# Patient Record
Sex: FEMALE | Race: WHITE | NOT HISPANIC OR LATINO | Employment: FULL TIME | URBAN - METROPOLITAN AREA
[De-identification: names, ages, dates, MRNs, and addresses within clinical notes are randomized per-mention and may not be internally consistent; named-entity substitution may affect disease eponyms.]

---

## 2021-07-08 ENCOUNTER — TELEPHONE (OUTPATIENT)
Dept: OBSTETRICS AND GYNECOLOGY | Facility: CLINIC | Age: 66
End: 2021-07-08

## 2021-07-09 ENCOUNTER — TELEPHONE (OUTPATIENT)
Dept: OBSTETRICS AND GYNECOLOGY | Facility: CLINIC | Age: 66
End: 2021-07-09

## 2022-03-08 DIAGNOSIS — Z12.11 COLON CANCER SCREENING: Primary | ICD-10-CM

## 2022-04-26 VITALS — BODY MASS INDEX: 26.22 KG/M2 | WEIGHT: 148 LBS | HEIGHT: 63 IN

## 2022-04-26 DIAGNOSIS — Z12.11 COLON CANCER SCREENING: Primary | ICD-10-CM

## 2022-04-26 RX ORDER — DESVENLAFAXINE SUCCINATE 25 MG/1
TABLET, EXTENDED RELEASE ORAL
COMMUNITY
Start: 2022-01-03 | End: 2022-09-13

## 2022-04-26 RX ORDER — LEVOTHYROXINE SODIUM 88 UG/1
88 TABLET ORAL
COMMUNITY
Start: 2022-04-12 | End: 2022-10-05 | Stop reason: SDUPTHER

## 2022-04-26 RX ORDER — SOD SULF/POT CHLORIDE/MAG SULF 1.479 G
12 TABLET ORAL DAILY
Qty: 24 TABLET | Refills: 0 | Status: CANCELLED | OUTPATIENT
Start: 2022-04-26

## 2022-04-26 NOTE — TELEPHONE ENCOUNTER
----- Message from Marci Villalobos sent at 4/26/2022  9:39 AM CDT -----  .Type:  Patient Returning Call    Who Called:self  Who Left Message for Patient:gualberto  Does the patient know what this is regarding?:yes  Would the patient rather a call back or a response via [facility]? Call back  Best Call Back Number:.[phone]    Additional Information: referral

## 2022-04-27 NOTE — TELEPHONE ENCOUNTER
----- Message from Mack Berg sent at 4/27/2022  9:12 AM CDT -----  Contact: Patient  Patient need the nurse to call her      Call back # [phone]

## 2022-04-27 NOTE — TELEPHONE ENCOUNTER
I spoke with the patient who reports difficulty swallowing solids. No issues with liquids. I saw that she is scheduled for a colonoscopy, but per ed chart, not due for screening until 2024. I believe scheduled from referral, so may be diagnostic? She reports that sometimes she feels as if she is going to pass out before a BM.  Either way, will she need clinic visit for EGD?  KDL, CMA

## 2022-05-02 PROBLEM — B00.9 HSV INFECTION: Status: ACTIVE | Noted: 2022-05-02

## 2022-05-02 NOTE — TELEPHONE ENCOUNTER
"The patient is technically not due for a repeat screening colonoscopy until 6/24/2024. She was marked as "No Show" for her follow up to the EGD from 2018. Make OV with [facility] before that time to determine if EGD or E/C indicated. I see 7/2022 lists in Epic but I cannot tell what for. OV or procedure?  NBP  "

## 2022-05-25 ENCOUNTER — TELEPHONE (OUTPATIENT)
Dept: GASTROENTEROLOGY | Facility: CLINIC | Age: 67
End: 2022-05-25

## 2022-05-25 NOTE — TELEPHONE ENCOUNTER
----- Message from Alberta Nash MA sent at 5/24/2022 10:38 AM CDT -----    ----- Message -----  From: Doreen Pearl  Sent: 5/24/2022  10:37 AM CDT  To: Zohra Rangel Staff    Pt needing to reschedule appt tomorrow, states she is stuck in [place] but wants to possibly be seen Thursday or Friday. Please call at [phone]

## 2022-05-27 ENCOUNTER — TELEPHONE (OUTPATIENT)
Dept: GASTROENTEROLOGY | Facility: CLINIC | Age: 67
End: 2022-05-27
Payer: COMMERCIAL

## 2022-05-27 NOTE — TELEPHONE ENCOUNTER
----- Message from Rachel Flores sent at 5/27/2022 11:22 AM CDT -----  Contact: self  Requesting a call back regarding rescheduling the new patient appointment with the nurse that she previously canceled. Please call back at [phone]

## 2022-05-30 ENCOUNTER — TELEPHONE (OUTPATIENT)
Dept: GASTROENTEROLOGY | Facility: CLINIC | Age: 67
End: 2022-05-30
Payer: COMMERCIAL

## 2022-05-30 NOTE — TELEPHONE ENCOUNTER
----- Message from Theodora Covarrubias MA sent at 5/27/2022 11:45 AM CDT -----    ----- Message -----  From: Paty Shelby  Sent: 5/27/2022  11:44 AM CDT  To: Quentin TUBBS Staff    Shira Franks is returning a missed call from Theodora. Please give her a call back at [phone]

## 2022-06-01 ENCOUNTER — TELEPHONE (OUTPATIENT)
Dept: OBSTETRICS AND GYNECOLOGY | Facility: CLINIC | Age: 67
End: 2022-06-01
Payer: COMMERCIAL

## 2022-06-01 NOTE — TELEPHONE ENCOUNTER
----- Message from Mack Berg sent at 6/1/2022 10:27 AM CDT -----  Contact: Patient  Please call the patient to schedule a sooner appointment    Patient need to schedule her annual      Call back #  [phone]

## 2022-06-15 NOTE — PROGRESS NOTES
Clinic Note    Reason for visit:  The primary encounter diagnosis was Esophageal dysphagia. Diagnoses of Diarrhea, unspecified type, Constipation, unspecified constipation type, Gastroesophageal reflux disease, unspecified whether esophagitis present, and Anemia, unspecified type were also pertinent to this visit.    PCP: Kaur Milian       HPI:  This is a 66 y.o. female who was last seen by Dr. Saavedra in 2018. Patient is scheduled for colonoscopy 7/6/2022 and is here to discuss upper GI symptoms. Patient reports dysphagia with solids x few years. Patient states food eventually goes down. She also reports reflux, does not take any medication for it but has taken panto in the past. Denies any melena or blood in stool. Patient also reports dry cough that has been going on a couple years. Patient reports constipation alternating with diarrhea, but more diarrhea phase. Lately, having daily BMs, but sometimes has to strain. Patient reports being anemic off and on her whole life. Does not know source of anemia and has not had work up that she knows of.       Outside record reviewed:  Last EGD 11/15/2018: GBx wnl, GEBx reflux, EBx reflux.  Last colonoscopy with FXB 6/24/2014: normal, repeat in 10 yr    Review of Systems   Constitutional: Positive for fatigue. Negative for chills, diaphoresis, fever and unexpected weight change.   HENT: Negative for mouth sores, nosebleeds, postnasal drip, sore throat, trouble swallowing and voice change.    Eyes: Positive for pain and eye dryness. Negative for discharge.   Respiratory: Positive for choking and shortness of breath. Negative for apnea, cough, chest tightness and wheezing.    Cardiovascular: Negative for chest pain, palpitations, leg swelling and claudication.   Gastrointestinal: Positive for constipation, diarrhea and reflux. Negative for abdominal distention, abdominal pain, anal bleeding, blood in stool, change in bowel habit, nausea, rectal pain, vomiting, fecal  incontinence and change in bowel habit.   Genitourinary: Negative for bladder incontinence, difficulty urinating, dysuria, flank pain, frequency and hematuria.   Musculoskeletal: Positive for back pain. Negative for arthralgias, joint swelling and joint deformity.   Integumentary:  Negative for color change, rash and wound.   Allergic/Immunologic: Negative for environmental allergies and food allergies.   Neurological: Negative for seizures, facial asymmetry, speech difficulty, weakness, headaches and memory loss.   Hematological: Negative for adenopathy. Does not bruise/bleed easily.   Psychiatric/Behavioral: Negative for agitation, behavioral problems, confusion, hallucinations and sleep disturbance.      Past Medical History:   Diagnosis Date    Anemia     BMI 26.0-26.9,adult     History of herpes simplex infection      Past Surgical History:   Procedure Laterality Date    AUGMENTATION OF BREAST      BUNIONECTOMY Bilateral     COLONOSCOPY  06/24/2014    LIPOSUCTION OF THIGH      REMOVAL OF BREAST IMPLANT       Family History   Problem Relation Age of Onset    Heart failure Mother     Liver disease Mother     COPD Mother     Cancer Father     Heart failure Father     Lung cancer Father     Skin cancer Father      Social History     Tobacco Use    Smoking status: Never Smoker    Smokeless tobacco: Never Used     Review of patient's allergies indicates:  No Known Allergies   Medication List with Changes/Refills   New Medications    PANTOPRAZOLE (PROTONIX) 40 MG TABLET    Take 1 tablet (40 mg total) by mouth once daily.    SOD SULF-POT CHLORIDE-MAG SULF (SUTAB) 1.479-0.188- 0.225 GRAM TABLET    Take 12 tablets by mouth once daily. Take according to package instructions with indicated amount of water. No breakfast day before test. May substitute with Suprep, Clenpiq, Plenvu, Moviprep or GoLytely based on Rx plan and patient preference.   Current Medications    DESVENLAFAXINE SUCCINATE (PRISTIQ) 25 MG  "TB24        LEVOTHYROXINE (SYNTHROID) 88 MCG TABLET        MULTIVIT-MIN-IRON-FA-LUTEIN (CENTRUM SILVER WOMEN) 8 MG IRON-400 MCG-300 MCG TAB    Centrum Silver Women   OTC daily         Vital Signs:  BP (P) 124/81 (BP Location: Right arm, Patient Position: Sitting)   Pulse (P) 79   Ht (P) 5' 3" (1.6 m)   Wt (P) 66.5 kg (146 lb 9.6 oz)   SpO2 (P) 97%   BMI (P) 25.97 kg/m²   Body mass index is 25.97 kg/m² (pended).      Physical Exam  Vitals reviewed.   Constitutional:       General: She is awake. She is not in acute distress.     Appearance: Normal appearance. She is well-developed. She is not ill-appearing, toxic-appearing or diaphoretic.   HENT:      Head: Normocephalic and atraumatic.      Nose: Nose normal.      Mouth/Throat:      Mouth: Mucous membranes are moist.      Pharynx: Oropharynx is clear. No oropharyngeal exudate or posterior oropharyngeal erythema.   Eyes:      General: Lids are normal. Gaze aligned appropriately. No scleral icterus.        Right eye: No discharge.         Left eye: No discharge.      Extraocular Movements: Extraocular movements intact.      Conjunctiva/sclera: Conjunctivae normal.   Neck:      Trachea: Trachea normal.   Cardiovascular:      Rate and Rhythm: Normal rate and regular rhythm.      Pulses:           Radial pulses are 2+ on the right side and 2+ on the left side.   Pulmonary:      Effort: Pulmonary effort is normal. No respiratory distress.      Breath sounds: Normal breath sounds. No stridor. No wheezing or rhonchi.   Chest:      Chest wall: No tenderness.   Abdominal:      General: Bowel sounds are normal. There is no distension.      Palpations: Abdomen is soft. There is no fluid wave, hepatomegaly or mass.      Tenderness: There is no abdominal tenderness. There is no guarding or rebound.   Musculoskeletal:         General: No tenderness or deformity.      Cervical back: Full passive range of motion without pain and neck supple. No tenderness.      Right lower leg: " No edema.      Left lower leg: No edema.   Lymphadenopathy:      Cervical: No cervical adenopathy.   Skin:     General: Skin is warm and dry.      Capillary Refill: Capillary refill takes less than 2 seconds.      Coloration: Skin is not cyanotic, jaundiced or pale.      Findings: No rash.   Neurological:      General: No focal deficit present.      Mental Status: She is alert and oriented to person, place, and time.      Cranial Nerves: No facial asymmetry.      Motor: No tremor.   Psychiatric:         Attention and Perception: Attention normal.         Mood and Affect: Mood and affect normal.         Speech: Speech normal.         Behavior: Behavior normal. Behavior is cooperative.            All of the data above and below has been reviewed by myself and any further interpretations will be reflected in the assessment and plan.   The data includes review of external notes, and independent interpretation of lab results, procedures, x-rays, and imaging reports.      Assessment:  Esophageal dysphagia  -     Ambulatory Referral to External Surgery    Diarrhea, unspecified type  -     Ambulatory Referral to External Surgery    Constipation, unspecified constipation type    Gastroesophageal reflux disease, unspecified whether esophagitis present  -     Ambulatory Referral to External Surgery  -     pantoprazole (PROTONIX) 40 MG tablet; Take 1 tablet (40 mg total) by mouth once daily.  Dispense: 30 tablet; Refill: 3    Anemia, unspecified type    Other orders  -     sod sulf-pot chloride-mag sulf (SUTAB) 1.479-0.188- 0.225 gram tablet; Take 12 tablets by mouth once daily. Take according to package instructions with indicated amount of water. No breakfast day before test. May substitute with Suprep, Clenpiq, Plenvu, Moviprep or GoLytely based on Rx plan and patient preference.  Dispense: 24 tablet; Refill: 0      Plan for colonoscopy with colon biopsies to rule out microscopic colitis  Dysphagia: stricture v tumor v  esophagitis v dysmotility. Consider EM if EGD unrevealing.  Will discuss anemia work up with Dr. Saavedra.      Recommendations:  Schedule EGD to be done with colonoscopy on 7/6/2022.  Begin taking pantoprazole 40 mg daily.  Begin using Squatty Potty with each restroom visit at home.     Risks, benefits, and alternatives of medical management, any associated procedures, and/or treatment discussed with the patient. Patient given opportunity to ask questions and voices understanding. Patient has elected to proceed with the recommended care modalities as discussed.        Order summary:  Orders Placed This Encounter   Procedures    Ambulatory Referral to External Surgery           Angelica Loa NP    This document may have been created using a voice recognition transcribing system. Incorrect words or phrases may have been missed during proofreading. Please interpret accordingly or contact me for clarification.

## 2022-06-16 ENCOUNTER — OFFICE VISIT (OUTPATIENT)
Dept: GASTROENTEROLOGY | Facility: CLINIC | Age: 67
End: 2022-06-16
Payer: COMMERCIAL

## 2022-06-16 DIAGNOSIS — D64.9 ANEMIA, UNSPECIFIED TYPE: ICD-10-CM

## 2022-06-16 DIAGNOSIS — K59.00 CONSTIPATION, UNSPECIFIED CONSTIPATION TYPE: ICD-10-CM

## 2022-06-16 DIAGNOSIS — R13.19 ESOPHAGEAL DYSPHAGIA: Primary | ICD-10-CM

## 2022-06-16 DIAGNOSIS — R19.7 DIARRHEA, UNSPECIFIED TYPE: ICD-10-CM

## 2022-06-16 DIAGNOSIS — K21.9 GASTROESOPHAGEAL REFLUX DISEASE, UNSPECIFIED WHETHER ESOPHAGITIS PRESENT: ICD-10-CM

## 2022-06-16 PROCEDURE — 99204 PR OFFICE/OUTPT VISIT, NEW, LEVL IV, 45-59 MIN: ICD-10-PCS | Mod: S$GLB,,,

## 2022-06-16 PROCEDURE — 99204 OFFICE O/P NEW MOD 45 MIN: CPT | Mod: S$GLB,,,

## 2022-06-16 RX ORDER — PANTOPRAZOLE SODIUM 40 MG/1
40 TABLET, DELAYED RELEASE ORAL DAILY
Qty: 30 TABLET | Refills: 3 | Status: SHIPPED | OUTPATIENT
Start: 2022-06-16 | End: 2022-10-26

## 2022-06-16 RX ORDER — MULTIVIT,IRON,MINERALS/LUTEIN
TABLET ORAL
COMMUNITY
End: 2022-10-26

## 2022-06-16 RX ORDER — SOD SULF/POT CHLORIDE/MAG SULF 1.479 G
12 TABLET ORAL DAILY
Qty: 24 TABLET | Refills: 0 | Status: SHIPPED | OUTPATIENT
Start: 2022-06-16 | End: 2022-09-13

## 2022-06-16 NOTE — LETTER
June 16, 2022        Kaur Milian MD  [address] [place] [address]             [facility]  [address] [place] [address]  Phone: [phone]  Fax: [phone]   Patient: Shira Franks   MR Number: [id number]   YOB: 1955   Date of Visit: 6/16/2022       Dear Dr. Milian:    Thank you for referring Shira Franks to me for evaluation. Attached you will find relevant portions of my assessment and plan of care.    If you have questions, please do not hesitate to call me. I look forward to following Shira Franks along with you.    Sincerely,      Angelica Lao, BEREKET            CC  No Recipients    Enclosure

## 2022-06-29 ENCOUNTER — TELEPHONE (OUTPATIENT)
Dept: GASTROENTEROLOGY | Facility: CLINIC | Age: 67
End: 2022-06-29
Payer: COMMERCIAL

## 2022-06-29 DIAGNOSIS — D64.9 ANEMIA, UNSPECIFIED TYPE: Primary | ICD-10-CM

## 2022-06-29 NOTE — TELEPHONE ENCOUNTER
----- Message from Isabel Saavedra MD sent at 6/21/2022  4:56 AM CDT -----      ----- Message -----  From: Angelica Lao NP  Sent: 6/16/2022  10:37 AM CDT  To: Isabel Saavedra MD

## 2022-07-01 ENCOUNTER — TELEPHONE (OUTPATIENT)
Dept: GASTROENTEROLOGY | Facility: CLINIC | Age: 67
End: 2022-07-01
Payer: COMMERCIAL

## 2022-07-01 NOTE — TELEPHONE ENCOUNTER
----- Message from Theodora Covarrubias MA sent at 7/1/2022 11:16 AM CDT -----    ----- Message -----  From: Paty Shelby  Sent: 7/1/2022  11:13 AM CDT  To: Quentin TUBBS Staff    Shira Franks is returning a missed call from the nurse, please give her another call back at [phone] (home)

## 2022-07-06 ENCOUNTER — OUTSIDE PLACE OF SERVICE (OUTPATIENT)
Dept: GASTROENTEROLOGY | Facility: CLINIC | Age: 67
End: 2022-07-06
Payer: COMMERCIAL

## 2022-07-06 LAB — CRC RECOMMENDATION EXT: NORMAL

## 2022-07-06 PROCEDURE — 43239 PR EGD, FLEX, W/BIOPSY, SGL/MULTI: ICD-10-PCS | Mod: 51,,, | Performed by: INTERNAL MEDICINE

## 2022-07-06 PROCEDURE — 45380 PR COLONOSCOPY,BIOPSY: ICD-10-PCS | Mod: ,,, | Performed by: INTERNAL MEDICINE

## 2022-07-06 PROCEDURE — 43239 EGD BIOPSY SINGLE/MULTIPLE: CPT | Mod: 51,,, | Performed by: INTERNAL MEDICINE

## 2022-07-06 PROCEDURE — 45380 COLONOSCOPY AND BIOPSY: CPT | Mod: ,,, | Performed by: INTERNAL MEDICINE

## 2022-07-11 ENCOUNTER — TELEPHONE (OUTPATIENT)
Dept: GASTROENTEROLOGY | Facility: CLINIC | Age: 67
End: 2022-07-11
Payer: COMMERCIAL

## 2022-07-11 NOTE — TELEPHONE ENCOUNTER
DBx nl, GBx react w/o Hp, GEBx reflux, CBx LC.  CMA to notify patient. No signs of infection, precancerous cells or Celiac disease on the upper endoscopy biopsies.  Her colon Bx confirm lymphocytic colitis which does not damage the colon but leads to diarrhea. Rec OTC peptobismol (generic okay) 2 chewable 4 times a day (not specific on the timing) x8 weeks. Black stools are common with the Pepto and harmless. I do not see the anemia work up results. Did she have them drawn? Let me know. Her next colonoscopy should be at her 10 year due date of 6/24/2014 give the stool throughout the colon in this colonoscopy.  NBP

## 2022-07-12 NOTE — TELEPHONE ENCOUNTER
Results and recommendations discussed with patient, who voices understanding and agreement. They will contact us with any issues. She reports having labs drawn at [facility]. I uploaded them and sent to [facility] and [facility] for review.  MALGORZATAL, CMA

## 2022-07-13 ENCOUNTER — TELEPHONE (OUTPATIENT)
Dept: GASTROENTEROLOGY | Facility: CLINIC | Age: 67
End: 2022-07-13
Payer: COMMERCIAL

## 2022-07-13 DIAGNOSIS — R13.19 ESOPHAGEAL DYSPHAGIA: Primary | ICD-10-CM

## 2022-07-13 NOTE — TELEPHONE ENCOUNTER
Results and recommendations discussed with patient, who voices understanding and agreement. She will come by the office to sign consents as able.    JEN, MICHAEL

## 2022-07-13 NOTE — TELEPHONE ENCOUNTER
Hb elect nl. Okay to order EM.   NBP    From [facility]:  CBC wnl, Hgb nl, Ferr/Folate/LDH/Fe/%sat/TIBC/Retic nl. B12 1469H.      Labs overall look well, no anemia, iron, or vitamin deficiencies. Patient was having dysphagia at time of OV. Okay to recommend EM?

## 2022-09-12 ENCOUNTER — DOCUMENTATION ONLY (OUTPATIENT)
Dept: GASTROENTEROLOGY | Facility: CLINIC | Age: 67
End: 2022-09-12
Payer: COMMERCIAL

## 2022-09-13 ENCOUNTER — OFFICE VISIT (OUTPATIENT)
Dept: OBSTETRICS AND GYNECOLOGY | Facility: CLINIC | Age: 67
End: 2022-09-13
Payer: COMMERCIAL

## 2022-09-13 VITALS
BODY MASS INDEX: 26.22 KG/M2 | HEIGHT: 63 IN | SYSTOLIC BLOOD PRESSURE: 121 MMHG | DIASTOLIC BLOOD PRESSURE: 78 MMHG | WEIGHT: 148 LBS

## 2022-09-13 DIAGNOSIS — Z12.31 SCREENING MAMMOGRAM, ENCOUNTER FOR: ICD-10-CM

## 2022-09-13 DIAGNOSIS — N95.1 MENOPAUSAL SYMPTOMS: ICD-10-CM

## 2022-09-13 DIAGNOSIS — Z01.419 ENCOUNTER FOR ROUTINE GYNECOLOGIC EXAMINATION IN MEDICARE PATIENT: Primary | ICD-10-CM

## 2022-09-13 DIAGNOSIS — E55.9 VITAMIN D DEFICIENCY: ICD-10-CM

## 2022-09-13 DIAGNOSIS — R32 URINARY INCONTINENCE, UNSPECIFIED TYPE: ICD-10-CM

## 2022-09-13 DIAGNOSIS — Z78.0 MENOPAUSE: ICD-10-CM

## 2022-09-13 DIAGNOSIS — N20.0 KIDNEY STONE: ICD-10-CM

## 2022-09-13 LAB
E2: <12.2 PG/ML
FREE TESTOSTERONE: NORMAL

## 2022-09-13 PROCEDURE — G0101 CA SCREEN;PELVIC/BREAST EXAM: HCPCS | Mod: S$GLB,,, | Performed by: OBSTETRICS & GYNECOLOGY

## 2022-09-13 PROCEDURE — G0101 PR CA SCREEN;PELVIC/BREAST EXAM: ICD-10-PCS | Mod: S$GLB,,, | Performed by: OBSTETRICS & GYNECOLOGY

## 2022-09-13 RX ORDER — FLUOXETINE 10 MG/1
10 CAPSULE ORAL DAILY
COMMUNITY
End: 2022-10-03

## 2022-09-13 NOTE — PROGRESS NOTES
Shira Franks is a 67 y.o. female  who presents for a well woman exam.  She has more problems with urinary incontinence over the last few years and was recently  diagnosed with a kidney stone at her PCP's office. She notes vaginal dryness and low libido.     Past Medical History:   Diagnosis Date    Anemia     History of herpes simplex infection     Hypothyroidism, unspecified     Kidney stone     Lymphocytic colitis     Osteopenia     Vitamin D deficiency        Past Surgical History:   Procedure Laterality Date    AUGMENTATION OF BREAST      BUNIONECTOMY Bilateral     COLONOSCOPY      14,  22    LIPOSUCTION OF THIGH      REMOVAL OF BREAST IMPLANT  2016    and mastopexy    TRIGGER FINGER RELEASE Left 2020       OB History    Para Term  AB Living   0 0 0 0 0 0   SAB IAB Ectopic Multiple Live Births   0 0 0 0 0       Family History   Problem Relation Age of Onset    Heart failure Mother     Liver disease Mother     COPD Mother     Cancer Father     Heart failure Father     Lung cancer Father     Skin cancer Father        Social History     Tobacco Use    Smoking status: Never    Smokeless tobacco: Never   Substance Use Topics    Alcohol use: Never    Drug use: Never         Current Outpatient Medications:     FLUoxetine 10 MG capsule, Take 10 mg by mouth once daily., Disp: , Rfl:     levothyroxine (SYNTHROID) 88 MCG tablet, , Disp: , Rfl:     multivit-min-iron-FA-lutein (CENTRUM SILVER WOMEN) 8 mg iron-400 mcg-300 mcg Tab, Centrum Silver Women  OTC daily, Disp: , Rfl:     pantoprazole (PROTONIX) 40 MG tablet, Take 1 tablet (40 mg total) by mouth once daily., Disp: 30 tablet, Rfl: 3     Review of patient's allergies indicates:  No Known Allergies     ROS:  GENERAL: Denies weight gain or weight loss. Feeling well overall.   SKIN: Denies rash or lesions.   HEAD: Denies head injury or headache.   NODES: Denies enlarged lymph nodes.   CHEST: Denies shortness of breath.   CARDIOVASCULAR:  "Denies palpitations or chest pain.   ABDOMEN: Denies abdominal pain, constipation, diarrhea, nausea, vomiting or rectal bleeding.   URINARY: Denies frequency, dysuria, hematuria, or burning on urination.  REPRODUCTIVE: See HPI.   BREASTS: Denies pain, lumps, or nipple discharge.   HEMATOLOGIC: Denies easy bruisability or excessive bleeding.  MUSCULOSKELETAL: Denies joint pain or swelling.   NEUROLOGIC: Denies syncope or weakness.   PSYCHIATRIC: Denies depression, anxiety or mood swings.    PHYSICAL EXAM:    /78   Ht 5' 3" (1.6 m)   Wt 67.1 kg (148 lb)   BMI 26.22 kg/m²    Body mass index is 26.22 kg/m².     APPEARANCE: Well nourished, well developed, in no acute distress.  AFFECT: WNL, alert and oriented x 3  SKIN: No acne or hirsutism  NECK: Neck symmetric without masses or thyromegaly  NODES: No inguinal, cervical, axillary, or femoral lymph node enlargement  CHEST: Good respiratory effect  ABDOMEN: Soft.  No tenderness or masses.  No hepatosplenomegaly.  No hernias.  BREASTS: Symmetrical, no skin changes or visible lesions.  No palpable masses, nipple discharge bilaterally.  PELVIC: Normal external genitalia without lesions.  Normal hair distribution.  Adequate perineal body, normal urethral meatus.  Urethra and bladder without tenderness or masses. Vagina moist and well rugated without lesions or discharge.  Cervix pink, without lesions, discharge or tenderness.  No significant cystocele or rectocele.  Bimanual exam shows uterus to be normal size, regular, mobile and nontender.  Adnexa without masses or tenderness.    EXTREMITIES: No edema.     Encounter for routine gynecologic examination in Medicare patient  -     Liquid-based pap smear, screening    Screening mammogram, encounter for  -     Mammo Digital Screening Bilat w/ [facility]; Future    Vitamin D deficiency  -     Calcitriol; Future; Expected date: 09/13/2022    Menopause  -     DXA Bone Density Spine And Hip; Future; Expected date: " 09/13/2022    Kidney stone  -     Ambulatory referral/consult to Urology; Future; Expected date: 09/20/2022    Urinary incontinence, unspecified type  -     Ambulatory referral/consult to Urology; Future; Expected date: 09/20/2022    Menopausal symptoms  -     Testosterone, Free; Future; Expected date: 09/20/2022  -     Estradiol; Future; Expected date: 09/20/2022  Discussed possible addition of vaginal estrogen  Will obtain CT report from Jasmyn Bailey NP showing kidney stone  Patient was counseled today on A.C.S. Pap guidelines and recommendations for yearly pelvic exams, mammograms and monthly self breast exams; to see her PCP for other health maintenance.     Follow up in 1 year

## 2022-09-14 ENCOUNTER — TELEPHONE (OUTPATIENT)
Dept: OBSTETRICS AND GYNECOLOGY | Facility: CLINIC | Age: 67
End: 2022-09-14
Payer: COMMERCIAL

## 2022-09-14 DIAGNOSIS — N95.1 MENOPAUSAL SYMPTOMS: Primary | ICD-10-CM

## 2022-09-14 DIAGNOSIS — N95.2 VAGINAL ATROPHY: Primary | ICD-10-CM

## 2022-09-14 RX ORDER — ESTRADIOL 0.1 MG/G
1 CREAM VAGINAL
Qty: 42.5 G | Refills: 3 | Status: SHIPPED | OUTPATIENT
Start: 2022-09-15 | End: 2022-10-05 | Stop reason: ALTCHOICE

## 2022-09-14 RX ORDER — ESTERIFIED ESTROGEN AND METHYLTESTOSTERONE .625; 1.25 MG/1; MG/1
1 TABLET ORAL DAILY
Qty: 30 TABLET | Refills: 5 | Status: SHIPPED | OUTPATIENT
Start: 2022-09-14 | End: 2022-12-06 | Stop reason: DRUGHIGH

## 2022-09-14 NOTE — TELEPHONE ENCOUNTER
----- Message from Shantelle Hebert sent at 9/14/2022  3:52 PM CDT -----  Type:  Needs Medical Advice    Who Called: Shira Franks    Symptoms (please be specific): -   How long has patient had these symptoms:  -  Pharmacy name and phone #:  -  Would the patient rather a call back or a response via [facility]?    Best Call Back Number: [phone]    Additional Information: pt would like to speak w/ María about medication, please call

## 2022-09-14 NOTE — TELEPHONE ENCOUNTER
"Pt. Called to see if she has to take new HRT qd.  I advised pt. "Yes".  Pt. Verbalized understanding.  "

## 2022-09-16 LAB — Lab: NORMAL

## 2022-09-18 ENCOUNTER — PATIENT MESSAGE (OUTPATIENT)
Dept: OBSTETRICS AND GYNECOLOGY | Facility: CLINIC | Age: 67
End: 2022-09-18
Payer: COMMERCIAL

## 2022-09-19 ENCOUNTER — PATIENT MESSAGE (OUTPATIENT)
Dept: OBSTETRICS AND GYNECOLOGY | Facility: CLINIC | Age: 67
End: 2022-09-19
Payer: COMMERCIAL

## 2022-09-19 NOTE — TELEPHONE ENCOUNTER
This may be due to not taking deep breaths and fully inflating her lungs if she was in pain from the kidney stone. She can follow up with her PCP regarding this finding.

## 2022-09-29 ENCOUNTER — TELEPHONE (OUTPATIENT)
Dept: OBSTETRICS AND GYNECOLOGY | Facility: CLINIC | Age: 67
End: 2022-09-29
Payer: COMMERCIAL

## 2022-09-29 DIAGNOSIS — N30.90 CYSTITIS: Primary | ICD-10-CM

## 2022-09-29 RX ORDER — SULFAMETHOXAZOLE AND TRIMETHOPRIM 800; 160 MG/1; MG/1
1 TABLET ORAL 2 TIMES DAILY
Qty: 14 TABLET | Refills: 0 | Status: SHIPPED | OUTPATIENT
Start: 2022-09-29 | End: 2022-10-07 | Stop reason: ALTCHOICE

## 2022-09-29 RX ORDER — PHENAZOPYRIDINE HYDROCHLORIDE 200 MG/1
200 TABLET, FILM COATED ORAL 3 TIMES DAILY PRN
Qty: 6 TABLET | Refills: 0 | Status: SHIPPED | OUTPATIENT
Start: 2022-09-29 | End: 2022-10-26

## 2022-09-29 NOTE — TELEPHONE ENCOUNTER
----- Message from Lona Tinajero sent at 9/29/2022  2:43 PM CDT -----  Regarding: Sooner Appointment  Contact: patient  Type:  Sooner Apoointment Request    Caller is requesting a sooner appointment.  Caller declined first available appointment listed below.  Caller will not accept being placed on the waitlist and is requesting a message be sent to doctor.  Name of Caller:Shira   When is the first available appointment? 12/2022  Symptoms: bladder infection or UTI  Would the patient rather a call back or a response via [facility]?  Call back   Best Call Back Number: [phone] (home)     Additional Information: The patient would like if something can be called in for her if she can't get a sooner appointment    ThanksPIOTR

## 2022-09-29 NOTE — TELEPHONE ENCOUNTER
C/o lower mid. abdominal pain/cramps/bloating/frequent urination x 1 day.  No other bladder/urinary sx.

## 2022-10-03 ENCOUNTER — PATIENT MESSAGE (OUTPATIENT)
Dept: OBSTETRICS AND GYNECOLOGY | Facility: CLINIC | Age: 67
End: 2022-10-03
Payer: COMMERCIAL

## 2022-10-03 ENCOUNTER — OFFICE VISIT (OUTPATIENT)
Dept: OBSTETRICS AND GYNECOLOGY | Facility: CLINIC | Age: 67
End: 2022-10-03
Payer: COMMERCIAL

## 2022-10-03 VITALS
SYSTOLIC BLOOD PRESSURE: 120 MMHG | BODY MASS INDEX: 26.26 KG/M2 | DIASTOLIC BLOOD PRESSURE: 70 MMHG | HEIGHT: 63 IN | WEIGHT: 148.19 LBS | HEART RATE: 70 BPM

## 2022-10-03 DIAGNOSIS — N30.90 CYSTITIS: Primary | ICD-10-CM

## 2022-10-03 DIAGNOSIS — N89.8 VAGINAL ITCHING: ICD-10-CM

## 2022-10-03 PROCEDURE — 99213 OFFICE O/P EST LOW 20 MIN: CPT | Mod: 25,S$GLB,, | Performed by: OBSTETRICS & GYNECOLOGY

## 2022-10-03 PROCEDURE — 99213 PR OFFICE/OUTPT VISIT, EST, LEVL III, 20-29 MIN: ICD-10-PCS | Mod: 25,S$GLB,, | Performed by: OBSTETRICS & GYNECOLOGY

## 2022-10-03 PROCEDURE — 81002 PR URINALYSIS NONAUTO W/O SCOPE: ICD-10-PCS | Mod: S$GLB,,, | Performed by: OBSTETRICS & GYNECOLOGY

## 2022-10-03 PROCEDURE — 87210 PR  SMEAR,STAIN,WET MNT,INTERP: ICD-10-PCS | Mod: QW,S$GLB,, | Performed by: OBSTETRICS & GYNECOLOGY

## 2022-10-03 PROCEDURE — 81002 URINALYSIS NONAUTO W/O SCOPE: CPT | Mod: S$GLB,,, | Performed by: OBSTETRICS & GYNECOLOGY

## 2022-10-03 PROCEDURE — 87210 SMEAR WET MOUNT SALINE/INK: CPT | Mod: QW,S$GLB,, | Performed by: OBSTETRICS & GYNECOLOGY

## 2022-10-03 NOTE — PROGRESS NOTES
Chief Complaint: burning and itching externally     HPI:      Shira Franks is a 67 y.o. female  who presents complaining of vaginal/vulvar irritation the day after using estradiol vaginal cream.  No LMP recorded. Patient is postmenopausal. Currently taking Bactrim for cystitis.     Past Medical History:   Diagnosis Date    Anemia     History of herpes simplex infection     Hypothyroidism, unspecified     Kidney stone     Lymphocytic colitis     Osteopenia     Vitamin D deficiency       Past Surgical History:   Procedure Laterality Date    AUGMENTATION OF BREAST      BUNIONECTOMY Bilateral     COLONOSCOPY      14,  22    LIPOSUCTION OF THIGH      REMOVAL OF BREAST IMPLANT  2016    and mastopexy    TRIGGER FINGER RELEASE Left 2020      Social History     Tobacco Use    Smoking status: Never     Passive exposure: Never    Smokeless tobacco: Never   Substance Use Topics    Alcohol use: Never    Drug use: Never      Current Outpatient Medications on File Prior to Visit   Medication Sig Dispense Refill    estradioL (ESTRACE) 0.01 % (0.1 mg/gram) vaginal cream Place 1 g vaginally twice a week. 42.5 g 3    estrogens,esterified,-methyltestosterone 0.625-1.25mg (ESTRATEST HS) per tablet Take 1 tablet by mouth once daily. 30 tablet 5    levothyroxine (SYNTHROID) 88 MCG tablet       multivit-min-iron-FA-lutein (CENTRUM SILVER WOMEN) 8 mg iron-400 mcg-300 mcg Tab Centrum Silver Women   OTC daily      phenazopyridine (PYRIDIUM) 200 MG tablet Take 1 tablet (200 mg total) by mouth 3 (three) times daily as needed for Pain. 6 tablet 0    sulfamethoxazole-trimethoprim 800-160mg (BACTRIM DS) 800-160 mg Tab Take 1 tablet by mouth 2 (two) times daily. 14 tablet 0    pantoprazole (PROTONIX) 40 MG tablet Take 1 tablet (40 mg total) by mouth once daily. 30 tablet 3    [DISCONTINUED] FLUoxetine 10 MG capsule Take 10 mg by mouth once daily.       No current facility-administered medications on file prior to visit.     "  Review of patient's allergies indicates:  No Known Allergies       ROS:     GENERAL: Denies weight gain or weight loss. Feeling well overall.   SKIN: Denies rash or lesions.   NODES: Denies enlarged lymph nodes.   CARDIOVASCULAR: Denies palpitations or chest pain.   ABDOMEN: Denies abdominal pain, constipation, diarrhea, nausea, vomiting or rectal bleeding.   URINARY: Denies frequency, dysuria, hematuria, or burning on urination.  REPRODUCTIVE: See HPI.   BREASTS: Denies pain, lumps, or nipple discharge.   HEMATOLOGIC: Denies easy bruisability or excessive bleeding with the exception of menstrual cycles.  PSYCHIATRIC: Denies depression, anxiety or mood swings.   Physical Exam:      /70 (BP Location: Left arm, Patient Position: Sitting, BP Method: Medium (Manual))   Pulse 70   Ht 5' 3" (1.6 m)   Wt 67.2 kg (148 lb 3.2 oz)   BMI 26.25 kg/m²   Body mass index is 26.25 kg/m².     ABDOMEN: Soft.  No tenderness or masses. No hepatoslenomegaly. No hernias.   PELVIC: Erythema and swelling of labia minora.  Normal hair distribution.  Adequate perineal body, normal urethral meatus.  Urethra and bladder without tenderness or masses. Vagina moist and well rugated without lesions; +white cream noted in vault     Results:      Wet prep: negative for trichomonas, yeast, or clue cells     Assessment/Plan:     Cystitis  -     Urine culture  -     POCT Urinalysis, Dipstick, Automated, W/O Scope    Vaginal itching  -     POCT WET PREP    She will discontinue estradiol cream   "

## 2022-10-05 ENCOUNTER — OFFICE VISIT (OUTPATIENT)
Dept: PRIMARY CARE CLINIC | Facility: CLINIC | Age: 67
End: 2022-10-05
Payer: COMMERCIAL

## 2022-10-05 VITALS
DIASTOLIC BLOOD PRESSURE: 64 MMHG | SYSTOLIC BLOOD PRESSURE: 106 MMHG | HEART RATE: 66 BPM | WEIGHT: 146.81 LBS | BODY MASS INDEX: 26.01 KG/M2 | OXYGEN SATURATION: 98 % | HEIGHT: 63 IN

## 2022-10-05 DIAGNOSIS — Z23 IMMUNIZATION DUE: Primary | ICD-10-CM

## 2022-10-05 DIAGNOSIS — Z00.00 WELLNESS EXAMINATION: ICD-10-CM

## 2022-10-05 DIAGNOSIS — E03.9 HYPOTHYROIDISM, UNSPECIFIED TYPE: ICD-10-CM

## 2022-10-05 PROCEDURE — 90677 PCV20 VACCINE IM: CPT | Mod: S$GLB,,, | Performed by: INTERNAL MEDICINE

## 2022-10-05 PROCEDURE — G0009 PNEUMOCOCCAL CONJUGATE VACCINE 20-VALENT: ICD-10-PCS | Mod: S$GLB,,, | Performed by: INTERNAL MEDICINE

## 2022-10-05 PROCEDURE — 90677 PNEUMOCOCCAL CONJUGATE VACCINE 20-VALENT: ICD-10-PCS | Mod: S$GLB,,, | Performed by: INTERNAL MEDICINE

## 2022-10-05 PROCEDURE — G0009 ADMIN PNEUMOCOCCAL VACCINE: HCPCS | Mod: S$GLB,,, | Performed by: INTERNAL MEDICINE

## 2022-10-05 PROCEDURE — 99204 OFFICE O/P NEW MOD 45 MIN: CPT | Mod: 25,S$GLB,, | Performed by: INTERNAL MEDICINE

## 2022-10-05 PROCEDURE — 99204 PR OFFICE/OUTPT VISIT, NEW, LEVL IV, 45-59 MIN: ICD-10-PCS | Mod: 25,S$GLB,, | Performed by: INTERNAL MEDICINE

## 2022-10-05 RX ORDER — LEVOTHYROXINE SODIUM 88 UG/1
88 TABLET ORAL
Qty: 90 TABLET | Refills: 3 | Status: SHIPPED | OUTPATIENT
Start: 2022-10-05 | End: 2023-10-24

## 2022-10-07 ENCOUNTER — PATIENT MESSAGE (OUTPATIENT)
Dept: OBSTETRICS AND GYNECOLOGY | Facility: CLINIC | Age: 67
End: 2022-10-07
Payer: COMMERCIAL

## 2022-10-07 DIAGNOSIS — N30.90 CYSTITIS: Primary | ICD-10-CM

## 2022-10-07 DIAGNOSIS — N95.2 VAGINAL ATROPHY: Primary | ICD-10-CM

## 2022-10-07 LAB
T4 FREE SERPL-MCNC: 0.7 NG/DL (ref 0.8–1.8)
TSH SERPL-ACNC: 2.17 MIU/L (ref 0.4–4.5)
URINE CULTURE, ROUTINE: NORMAL

## 2022-10-07 RX ORDER — ESTRADIOL 10 UG/1
10 INSERT VAGINAL
Qty: 8 TABLET | Refills: 11 | Status: SHIPPED | OUTPATIENT
Start: 2022-10-10 | End: 2023-04-06 | Stop reason: ALTCHOICE

## 2022-10-07 RX ORDER — CIPROFLOXACIN 500 MG/1
500 TABLET ORAL 2 TIMES DAILY
Qty: 14 TABLET | Refills: 0 | Status: SHIPPED | OUTPATIENT
Start: 2022-10-07 | End: 2022-10-14

## 2022-10-11 ENCOUNTER — DOCUMENTATION ONLY (OUTPATIENT)
Dept: GASTROENTEROLOGY | Facility: CLINIC | Age: 67
End: 2022-10-11
Payer: COMMERCIAL

## 2022-10-12 ENCOUNTER — PATIENT MESSAGE (OUTPATIENT)
Dept: PRIMARY CARE CLINIC | Facility: CLINIC | Age: 67
End: 2022-10-12
Payer: COMMERCIAL

## 2022-10-26 ENCOUNTER — OFFICE VISIT (OUTPATIENT)
Dept: OBSTETRICS AND GYNECOLOGY | Facility: CLINIC | Age: 67
End: 2022-10-26
Payer: COMMERCIAL

## 2022-10-26 ENCOUNTER — PATIENT MESSAGE (OUTPATIENT)
Dept: OBSTETRICS AND GYNECOLOGY | Facility: CLINIC | Age: 67
End: 2022-10-26

## 2022-10-26 VITALS
BODY MASS INDEX: 25.96 KG/M2 | HEIGHT: 63 IN | WEIGHT: 146.5 LBS | HEART RATE: 65 BPM | DIASTOLIC BLOOD PRESSURE: 70 MMHG | SYSTOLIC BLOOD PRESSURE: 110 MMHG

## 2022-10-26 DIAGNOSIS — R10.2 PELVIC PAIN: ICD-10-CM

## 2022-10-26 DIAGNOSIS — N30.90 CYSTITIS: Primary | ICD-10-CM

## 2022-10-26 PROCEDURE — 81002 URINALYSIS NONAUTO W/O SCOPE: CPT | Mod: S$GLB,,, | Performed by: OBSTETRICS & GYNECOLOGY

## 2022-10-26 PROCEDURE — 81003 URINALYSIS AUTO W/O SCOPE: CPT | Mod: QW,S$GLB,, | Performed by: OBSTETRICS & GYNECOLOGY

## 2022-10-26 PROCEDURE — 99213 OFFICE O/P EST LOW 20 MIN: CPT | Mod: S$GLB,,, | Performed by: OBSTETRICS & GYNECOLOGY

## 2022-10-26 PROCEDURE — 81003 POCT URINALYSIS, DIPSTICK, AUTOMATED, W/O SCOPE: ICD-10-PCS | Mod: QW,S$GLB,, | Performed by: OBSTETRICS & GYNECOLOGY

## 2022-10-26 PROCEDURE — 99213 PR OFFICE/OUTPT VISIT, EST, LEVL III, 20-29 MIN: ICD-10-PCS | Mod: S$GLB,,, | Performed by: OBSTETRICS & GYNECOLOGY

## 2022-10-26 PROCEDURE — 81002 PR URINALYSIS NONAUTO W/O SCOPE: ICD-10-PCS | Mod: S$GLB,,, | Performed by: OBSTETRICS & GYNECOLOGY

## 2022-10-26 RX ORDER — NITROFURANTOIN 25; 75 MG/1; MG/1
100 CAPSULE ORAL 2 TIMES DAILY
Qty: 14 CAPSULE | Refills: 0 | Status: SHIPPED | OUTPATIENT
Start: 2022-10-26 | End: 2022-11-02

## 2022-10-26 NOTE — PROGRESS NOTES
Chief Complaint: low abdominal discomfort     HPI:      Shira Franks is a 67 y.o. female  who presents complaining of low abdominal pain and bloating as well as urinary odor.  She denies any vaginal discharge or irritation.  No LMP recorded. Patient is postmenopausal.    Past Medical History:   Diagnosis Date    Anemia     History of herpes simplex infection     Hypothyroidism, unspecified     Kidney stone     Lymphocytic colitis     Osteopenia     Vasovagal syncope     Vitamin D deficiency       Past Surgical History:   Procedure Laterality Date    AUGMENTATION OF BREAST      BUNIONECTOMY Bilateral     COLONOSCOPY      14,  22    LIPOSUCTION OF THIGH      REMOVAL OF BREAST IMPLANT  2016    and mastopexy    REMOVAL OF BREAST IMPLANT      TRIGGER FINGER RELEASE Left 2020      Social History     Tobacco Use    Smoking status: Never     Passive exposure: Never    Smokeless tobacco: Never   Substance Use Topics    Alcohol use: Never    Drug use: Never      Current Outpatient Medications on File Prior to Visit   Medication Sig Dispense Refill    estradioL (VAGIFEM) 10 mcg Tab Place 1 tablet (10 mcg total) vaginally twice a week. 8 tablet 11    estrogens,esterified,-methyltestosterone 0.625-1.25mg (ESTRATEST HS) per tablet Take 1 tablet by mouth once daily. 30 tablet 5    levothyroxine (SYNTHROID) 88 MCG tablet Take 1 tablet (88 mcg total) by mouth before breakfast. 90 tablet 3    [DISCONTINUED] multivit-min-iron-FA-lutein (CENTRUM SILVER WOMEN) 8 mg iron-400 mcg-300 mcg Tab Centrum Silver Women   OTC daily      [DISCONTINUED] pantoprazole (PROTONIX) 40 MG tablet Take 1 tablet (40 mg total) by mouth once daily. 30 tablet 3    [DISCONTINUED] phenazopyridine (PYRIDIUM) 200 MG tablet Take 1 tablet (200 mg total) by mouth 3 (three) times daily as needed for Pain. 6 tablet 0     No current facility-administered medications on file prior to visit.      Review of patient's allergies indicates:  No Known  "Allergies       ROS:     GENERAL: Denies weight gain or weight loss. Feeling well overall.   SKIN: Denies rash or lesions.   NODES: Denies enlarged lymph nodes.   CARDIOVASCULAR: Denies palpitations or chest pain.   ABDOMEN: Denies abdominal pain, constipation, diarrhea, nausea, vomiting or rectal bleeding.   URINARY: Denies frequency, dysuria, hematuria, or burning on urination.  REPRODUCTIVE: See HPI.   BREASTS: Denies pain, lumps, or nipple discharge.   HEMATOLOGIC: Denies easy bruisability or excessive bleeding   PSYCHIATRIC: Denies depression, anxiety or mood swings.   Physical Exam:      /70 (BP Location: Left arm, Patient Position: Sitting, BP Method: Medium (Manual))   Pulse 65   Ht 5' 3" (1.6 m)   Wt 66.5 kg (146 lb 8 oz)   BMI 25.95 kg/m²   Body mass index is 25.95 kg/m².     ABDOMEN: Soft.  No tenderness or masses. No hepatoslenomegaly. No hernias.   PELVIC: Normal external genitalia without lesions.  Normal hair distribution.  Adequate perineal body, normal urethral meatus.  Urethra and bladder without tenderness or masses. Vagina moist and well rugated without lesions or discharge.  Cervix pink, without lesions, discharge or tenderness.  No significant cystocele or rectocele.      UA: +++leuk; +++blood; +nitrites    Assessment/Plan:     Cystitis  -     nitrofurantoin, macrocrystal-monohydrate, (MACROBID) 100 MG capsule; Take 1 capsule (100 mg total) by mouth 2 (two) times daily. for 7 days  Dispense: 14 capsule; Refill: 0    Pelvic pain  -     POCT Urinalysis, Dipstick, Automated, W/O Scope  -     Urine culture    She has a urology appt in early November  "

## 2022-10-30 DIAGNOSIS — N30.90 CYSTITIS: Primary | ICD-10-CM

## 2022-10-30 LAB — URINE CULTURE, ROUTINE: NORMAL

## 2022-10-31 ENCOUNTER — TELEPHONE (OUTPATIENT)
Dept: OBSTETRICS AND GYNECOLOGY | Facility: CLINIC | Age: 67
End: 2022-10-31
Payer: COMMERCIAL

## 2022-10-31 NOTE — TELEPHONE ENCOUNTER
----- Message from Jennifer Snowden MD sent at 10/30/2022 11:48 AM CDT -----  Please notify pt that urine culture shows that bacteria should be adequately treated with antibiotic she is on. Please have her repeat UA one week after her last dose to make sure it clears. Order sent to [facility]

## 2022-11-03 ENCOUNTER — PATIENT MESSAGE (OUTPATIENT)
Dept: UROLOGY | Facility: CLINIC | Age: 67
End: 2022-11-03

## 2022-11-08 ENCOUNTER — OFFICE VISIT (OUTPATIENT)
Dept: UROLOGY | Facility: CLINIC | Age: 67
End: 2022-11-08
Payer: COMMERCIAL

## 2022-11-08 VITALS
DIASTOLIC BLOOD PRESSURE: 67 MMHG | BODY MASS INDEX: 25.87 KG/M2 | WEIGHT: 146 LBS | SYSTOLIC BLOOD PRESSURE: 110 MMHG | HEIGHT: 63 IN | HEART RATE: 63 BPM

## 2022-11-08 DIAGNOSIS — R32 URINARY INCONTINENCE, UNSPECIFIED TYPE: ICD-10-CM

## 2022-11-08 DIAGNOSIS — R33.9 URINARY RETENTION: Primary | ICD-10-CM

## 2022-11-08 DIAGNOSIS — N20.0 KIDNEY STONE: ICD-10-CM

## 2022-11-08 PROCEDURE — 99204 OFFICE O/P NEW MOD 45 MIN: CPT | Mod: S$GLB,,, | Performed by: UROLOGY

## 2022-11-08 PROCEDURE — 99204 PR OFFICE/OUTPT VISIT, NEW, LEVL IV, 45-59 MIN: ICD-10-PCS | Mod: S$GLB,,, | Performed by: UROLOGY

## 2022-11-08 RX ORDER — OXYBUTYNIN CHLORIDE 10 MG/1
10 TABLET, EXTENDED RELEASE ORAL DAILY
Qty: 30 TABLET | Refills: 11 | Status: SHIPPED | OUTPATIENT
Start: 2022-11-08 | End: 2024-02-06

## 2022-11-08 NOTE — PROGRESS NOTES
Subjective:       Patient ID: Shira Franks is a 67 y.o. female.    Chief Complaint: Nephrolithiasis and Urinary Incontinence      HPI:  59-year-old female with history of nephrolithiasis she also has urinary frequency some urgency and which she describes as some wetness in her underwear at the end of the day but can attribute when the incontinence happened.  Patient does have some significant urgency and frequency    Past Medical History:   Past Medical History:   Diagnosis Date    Anemia     History of herpes simplex infection     Hypothyroidism, unspecified     Kidney stone     Lymphocytic colitis     Osteopenia     Vasovagal syncope     Vitamin D deficiency        Past Surgical Historical:   Past Surgical History:   Procedure Laterality Date    AUGMENTATION OF BREAST      BUNIONECTOMY Bilateral     COLONOSCOPY      6/24/14,  7/6/22    LIPOSUCTION OF THIGH      REMOVAL OF BREAST IMPLANT  11/2016    and mastopexy    REMOVAL OF BREAST IMPLANT      TRIGGER FINGER RELEASE Left 06/2020        Medications:   Medication List with Changes/Refills   Current Medications    ESTRADIOL (VAGIFEM) 10 MCG TAB    Place 1 tablet (10 mcg total) vaginally twice a week.    ESTROGENS,ESTERIFIED,-METHYLTESTOSTERONE 0.625-1.25MG (ESTRATEST HS) PER TABLET    Take 1 tablet by mouth once daily.    LEVOTHYROXINE (SYNTHROID) 88 MCG TABLET    Take 1 tablet (88 mcg total) by mouth before breakfast.        Past Social History:   Social History     Socioeconomic History    Marital status:    Tobacco Use    Smoking status: Never     Passive exposure: Never    Smokeless tobacco: Never   Substance and Sexual Activity    Alcohol use: Never    Drug use: Never    Sexual activity: Not Currently     Partners: Male     Birth control/protection: See Surgical Hx     Comment: Hyst       Allergies: Review of patient's allergies indicates:  No Known Allergies     Family History:   Family History   Problem Relation Age of Onset    Heart failure Mother      Liver disease Mother     COPD Mother     Cancer Father     Heart failure Father     Lung cancer Father     Skin cancer Father         Review of Systems:  Review of Systems   Constitutional:  Negative for activity change and appetite change.   HENT:  Negative for congestion and dental problem.    Eyes:  Negative for pain and discharge.   Respiratory:  Negative for chest tightness and shortness of breath.    Cardiovascular:  Negative for chest pain.   Gastrointestinal:  Negative for abdominal distention and abdominal pain.   Endocrine: Negative for cold intolerance, heat intolerance and polyuria.   Genitourinary:  Negative for decreased urine volume, difficulty urinating, dyspareunia, dysuria, enuresis, flank pain, frequency, genital sores, hematuria, menstrual problem, pelvic pain, urgency, vaginal bleeding, vaginal discharge and vaginal pain.   Musculoskeletal:  Negative for back pain and neck pain.   Skin:  Negative for color change and rash.   Allergic/Immunologic: Negative for immunocompromised state.   Neurological:  Negative for dizziness.   Hematological:  Negative for adenopathy.   Psychiatric/Behavioral:  Negative for agitation, behavioral problems and confusion.      Physical Exam:  Physical Exam  Constitutional:       Appearance: She is well-developed.   HENT:      Head: Normocephalic and atraumatic.      Right Ear: External ear normal.      Left Ear: External ear normal.   Eyes:      Conjunctiva/sclera: Conjunctivae normal.   Neck:      Vascular: No JVD.   Cardiovascular:      Rate and Rhythm: Normal rate and regular rhythm.   Pulmonary:      Effort: Pulmonary effort is normal. No respiratory distress.      Breath sounds: Normal breath sounds. No wheezing.   Abdominal:      General: There is no distension.      Palpations: Abdomen is soft.      Tenderness: There is no abdominal tenderness. There is no rebound.   Musculoskeletal:         General: Normal range of motion.      Cervical back: Normal range of  motion.   Skin:     General: Skin is warm and dry.      Findings: No erythema or rash.   Neurological:      Mental Status: She is alert and oriented to person, place, and time.   Psychiatric:         Behavior: Behavior normal.       Assessment/Plan:       Problem List Items Addressed This Visit    None  Visit Diagnoses       Kidney stone        Urinary incontinence, unspecified type                   Urinary urgency:  I believe the patient's primary issue is frequency and urgency will put her on 10 mg of Ditropan    Nephrolithiasis:  Patient is asymptomatic this time will hold off on any imaging

## 2022-11-11 ENCOUNTER — PATIENT MESSAGE (OUTPATIENT)
Dept: OBSTETRICS AND GYNECOLOGY | Facility: CLINIC | Age: 67
End: 2022-11-11
Payer: COMMERCIAL

## 2022-11-11 ENCOUNTER — TELEPHONE (OUTPATIENT)
Dept: OBSTETRICS AND GYNECOLOGY | Facility: CLINIC | Age: 67
End: 2022-11-11
Payer: COMMERCIAL

## 2022-11-11 LAB — BCS RECOMMENDATION EXT: NORMAL

## 2022-11-11 NOTE — TELEPHONE ENCOUNTER
----- Message from Mack Berg sent at 11/11/2022 11:38 AM CST -----  Contact: Patient  Patient need the nurse to call her regarding her lab work    What test are you all sending her to have blood or urine labs?    Please call patient   [phone]

## 2022-11-17 ENCOUNTER — OFFICE VISIT (OUTPATIENT)
Dept: OBSTETRICS AND GYNECOLOGY | Facility: CLINIC | Age: 67
End: 2022-11-17
Payer: COMMERCIAL

## 2022-11-17 VITALS
DIASTOLIC BLOOD PRESSURE: 70 MMHG | HEART RATE: 70 BPM | BODY MASS INDEX: 25.87 KG/M2 | SYSTOLIC BLOOD PRESSURE: 110 MMHG | WEIGHT: 146 LBS | HEIGHT: 63 IN

## 2022-11-17 DIAGNOSIS — N30.90 CYSTITIS: Primary | ICD-10-CM

## 2022-11-17 PROCEDURE — 99213 OFFICE O/P EST LOW 20 MIN: CPT | Mod: 25,S$GLB,, | Performed by: OBSTETRICS & GYNECOLOGY

## 2022-11-17 PROCEDURE — 51701 PR INSERTION OF NON-INDWELLING BLADDER CATHETERIZATION FOR RESIDUAL UR: ICD-10-PCS | Mod: S$GLB,,, | Performed by: OBSTETRICS & GYNECOLOGY

## 2022-11-17 PROCEDURE — 51701 INSERT BLADDER CATHETER: CPT | Mod: S$GLB,,, | Performed by: OBSTETRICS & GYNECOLOGY

## 2022-11-17 PROCEDURE — 99213 PR OFFICE/OUTPT VISIT, EST, LEVL III, 20-29 MIN: ICD-10-PCS | Mod: 25,S$GLB,, | Performed by: OBSTETRICS & GYNECOLOGY

## 2022-11-17 NOTE — PROGRESS NOTES
Chief Complaint: follow up      HPI:      Shira Franks is a 67 y.o. female  who presents complaining of suprapubic tenderness and occasional burning still.  Clean catch UA done at the lab still showed contaminated specimen.  She denies any vaginal symptoms.  She got some relief with oxybutynin prescribed by her urologist.     Past Medical History:   Diagnosis Date    Anemia     History of herpes simplex infection     Hypothyroidism, unspecified     Kidney stone     Lymphocytic colitis     Osteopenia     Vasovagal syncope     Vitamin D deficiency       Past Surgical History:   Procedure Laterality Date    AUGMENTATION OF BREAST      BUNIONECTOMY Bilateral     COLONOSCOPY      14,  22    LIPOSUCTION OF THIGH      REMOVAL OF BREAST IMPLANT  2016    and mastopexy    REMOVAL OF BREAST IMPLANT      TRIGGER FINGER RELEASE Left 2020      Social History     Tobacco Use    Smoking status: Never     Passive exposure: Never    Smokeless tobacco: Never   Substance Use Topics    Alcohol use: Never    Drug use: Never      Current Outpatient Medications on File Prior to Visit   Medication Sig Dispense Refill    estradioL (VAGIFEM) 10 mcg Tab Place 1 tablet (10 mcg total) vaginally twice a week. 8 tablet 11    estrogens,esterified,-methyltestosterone 0.625-1.25mg (ESTRATEST HS) per tablet Take 1 tablet by mouth once daily. 30 tablet 5    levothyroxine (SYNTHROID) 88 MCG tablet Take 1 tablet (88 mcg total) by mouth before breakfast. 90 tablet 3    oxybutynin (DITROPAN-XL) 10 MG 24 hr tablet Take 1 tablet (10 mg total) by mouth once daily. 30 tablet 11     No current facility-administered medications on file prior to visit.      Review of patient's allergies indicates:  No Known Allergies       ROS:     GENERAL: Denies weight gain or weight loss. Feeling well overall.   SKIN: Denies rash or lesions.   NODES: Denies enlarged lymph nodes.   CARDIOVASCULAR: Denies palpitations or chest pain.   ABDOMEN: Denies  "abdominal pain, constipation, diarrhea, nausea, vomiting or rectal bleeding.   URINARY: Denies frequency, dysuria, hematuria, or burning on urination.  REPRODUCTIVE: See HPI.   BREASTS: Denies pain, lumps, or nipple discharge.   HEMATOLOGIC: Denies easy bruisability or excessive bleeding   PSYCHIATRIC: Denies depression, anxiety or mood swings.     Physical Exam:      /70 (BP Location: Left arm, Patient Position: Sitting, BP Method: Medium (Manual))   Pulse 70   Ht 5' 3" (1.6 m)   Wt 66.2 kg (146 lb)   BMI 25.86 kg/m²   Body mass index is 25.86 kg/m².     PELVIC: Normal external genitalia without lesions.  Normal hair distribution.  Adequate perineal body, normal urethral meatus.  Urethra and bladder without tenderness or masses. Vagina moist and well rugated without lesions or discharge.  Cervix pink, without lesions, discharge or tenderness.     I & O cath done after betadine prep      Assessment/Plan:     Cystitis  -     Cancel: Urinalysis, Reflex to Urine Culture Urine, Catheterized; Future; Expected date: 11/17/2022  -     Urine culture      "

## 2022-11-19 LAB — URINE CULTURE, ROUTINE: NORMAL

## 2022-11-21 ENCOUNTER — TELEPHONE (OUTPATIENT)
Dept: OBSTETRICS AND GYNECOLOGY | Facility: CLINIC | Age: 67
End: 2022-11-21
Payer: COMMERCIAL

## 2022-11-21 NOTE — TELEPHONE ENCOUNTER
----- Message from Jennifer Snowden MD sent at 11/20/2022  1:56 PM CST -----  Please notify patient of negative urine culture result. If she is still having bladder discomfort then I can add a bladder anti spasmodic to her ditropan from the urologist.

## 2022-12-02 ENCOUNTER — PATIENT MESSAGE (OUTPATIENT)
Dept: OBSTETRICS AND GYNECOLOGY | Facility: CLINIC | Age: 67
End: 2022-12-02
Payer: COMMERCIAL

## 2022-12-02 NOTE — TELEPHONE ENCOUNTER
Patient was started on vagifem 10mcg tabs on 10/7/22. From reviewing her chart, it looks like she has previously been on estratest hs 0.625 and estrace 0.01% vaginal cream. Patient states that she was put on a low dose hormone pill instead of an anxiety medication. She feels that she still needs a little bit more to help with her mood. Please advise.

## 2022-12-05 NOTE — TELEPHONE ENCOUNTER
Please notify pt that I only added vaginal estrogen tablets to help with the bladder issues but she should still be on her hormone tablets by mouth (Estratest HS) and also her PCP had her Fluoxetine 10 mg

## 2022-12-06 ENCOUNTER — PATIENT MESSAGE (OUTPATIENT)
Dept: OBSTETRICS AND GYNECOLOGY | Facility: CLINIC | Age: 67
End: 2022-12-06
Payer: COMMERCIAL

## 2022-12-06 DIAGNOSIS — N95.1 MENOPAUSAL SYMPTOMS: Primary | ICD-10-CM

## 2022-12-06 RX ORDER — ESTERIFIED ESTROGEN AND METHYLTESTOSTERONE 1.25; 2.5 MG/1; MG/1
1 TABLET ORAL DAILY
Qty: 30 TABLET | Refills: 5 | Status: SHIPPED | OUTPATIENT
Start: 2022-12-06 | End: 2023-04-06 | Stop reason: ALTCHOICE

## 2022-12-06 NOTE — TELEPHONE ENCOUNTER
Please notify pt that prescription sent for full strength Estratest. If that does not help she may want to see Dr. Knott about anti depressant medication again.

## 2022-12-08 ENCOUNTER — OFFICE VISIT (OUTPATIENT)
Dept: PRIMARY CARE CLINIC | Facility: CLINIC | Age: 67
End: 2022-12-08
Payer: COMMERCIAL

## 2022-12-08 VITALS
HEART RATE: 65 BPM | HEIGHT: 63 IN | OXYGEN SATURATION: 99 % | WEIGHT: 152 LBS | DIASTOLIC BLOOD PRESSURE: 74 MMHG | SYSTOLIC BLOOD PRESSURE: 114 MMHG | BODY MASS INDEX: 26.93 KG/M2

## 2022-12-08 DIAGNOSIS — B02.9 HERPES ZOSTER WITHOUT COMPLICATION: ICD-10-CM

## 2022-12-08 DIAGNOSIS — Z23 FLU VACCINE NEED: Primary | ICD-10-CM

## 2022-12-08 PROCEDURE — 99213 PR OFFICE/OUTPT VISIT, EST, LEVL III, 20-29 MIN: ICD-10-PCS | Mod: S$GLB,,, | Performed by: INTERNAL MEDICINE

## 2022-12-08 PROCEDURE — 90694 VACC AIIV4 NO PRSRV 0.5ML IM: CPT | Mod: S$GLB,,, | Performed by: INTERNAL MEDICINE

## 2022-12-08 PROCEDURE — G0008 ADMIN INFLUENZA VIRUS VAC: HCPCS | Mod: S$GLB,,, | Performed by: INTERNAL MEDICINE

## 2022-12-08 PROCEDURE — G0008 FLU VACCINE - QUADRIVALENT - ADJUVANTED: ICD-10-PCS | Mod: S$GLB,,, | Performed by: INTERNAL MEDICINE

## 2022-12-08 PROCEDURE — 99213 OFFICE O/P EST LOW 20 MIN: CPT | Mod: S$GLB,,, | Performed by: INTERNAL MEDICINE

## 2022-12-08 PROCEDURE — 90694 FLU VACCINE - QUADRIVALENT - ADJUVANTED: ICD-10-PCS | Mod: S$GLB,,, | Performed by: INTERNAL MEDICINE

## 2022-12-08 RX ORDER — VALACYCLOVIR HYDROCHLORIDE 1 G/1
1000 TABLET, FILM COATED ORAL 3 TIMES DAILY
Qty: 21 TABLET | Refills: 0 | Status: SHIPPED | OUTPATIENT
Start: 2022-12-08 | End: 2023-04-06 | Stop reason: ALTCHOICE

## 2022-12-08 NOTE — PROGRESS NOTES
"Subjective:      Patient ID: Shira Franks is a 67 y.o. female.    Chief Complaint: Rash (Left side of inner thigh. Redness, sensitive and a little painful. OTC neosporin ) and Flu Vaccine    HPI    Here for evaluation of rash. Went fishing Saturday. Sunday noticed a pain/tingling in her leg, later that night noticed the rash.  Googled symptoms and she started to worry. Has had the zostavax in the past.       ROS    Rash and leg pain    Objective:     Physical Exam  Constitutional:       Appearance: Normal appearance.   HENT:      Head: Normocephalic.   Eyes:      Extraocular Movements: Extraocular movements intact.      Conjunctiva/sclera: Conjunctivae normal.      Pupils: Pupils are equal, round, and reactive to light.   Skin:     General: Skin is warm.      Capillary Refill: Capillary refill takes less than 2 seconds.      Findings: Erythema and rash present.      Comments: Cluster of vesicles on inner left thigh, no other areas of rash seen   Neurological:      General: No focal deficit present.      Mental Status: She is alert and oriented to person, place, and time.   Psychiatric:         Mood and Affect: Mood normal.     /74 (BP Location: Left arm, Patient Position: Sitting, BP Method: Medium (Automatic))   Pulse 65   Ht 5' 3" (1.6 m)   Wt 68.9 kg (152 lb)   SpO2 99%   BMI 26.93 kg/m²     Assessment:       ICD-10-CM ICD-9-CM   1. Herpes zoster without complication  B02.9 053.9       Plan:     Medication List with Changes/Refills   New Medications    VALACYCLOVIR (VALTREX) 1000 MG TABLET    Take 1 tablet (1,000 mg total) by mouth 3 (three) times daily. for 7 days   Current Medications    ESTRADIOL (VAGIFEM) 10 MCG TAB    Place 1 tablet (10 mcg total) vaginally twice a week.    ESTROGENS,ESTERIFIED,-METHYLTESTOSTERONE 1.25-2.5MG (ESTRATEST) PER TABLET    Take 1 tablet by mouth once daily.    LEVOTHYROXINE (SYNTHROID) 88 MCG TABLET    Take 1 tablet (88 mcg total) by mouth before breakfast.    OXYBUTYNIN " (DITROPAN-XL) 10 MG 24 HR TABLET    Take 1 tablet (10 mg total) by mouth once daily.    PANTOPRAZOLE (PROTONIX) 40 MG TABLET    TAKE 1 TABLET (40 MG TOTAL) BY MOUTH ONCE DAILY.    PANTOPRAZOLE (PROTONIX) 40 MG TABLET    Take 40 mg by mouth once daily.        1. Herpes zoster without complication  -     valACYclovir (VALTREX) 1000 MG tablet; Take 1 tablet (1,000 mg total) by mouth 3 (three) times daily. for 7 days  Dispense: 21 tablet; Refill: 0       Avoid tight fitting clothes. Keep area covered     If no improvement in the next 5 days will send another week supply          Future Appointments   Date Time Provider Department Center   2/1/2023  1:00 PM Isabel Saavedra MD [facility] GASTRO  [address]   4/10/2023  9:40 AM Shelia Knott MD [facility]  [address]   11/8/2023 11:00 AM Stella Moody NP [facility] UROLOGY  [address]

## 2022-12-09 ENCOUNTER — PATIENT OUTREACH (OUTPATIENT)
Dept: ADMINISTRATIVE | Facility: HOSPITAL | Age: 67
End: 2022-12-09
Payer: COMMERCIAL

## 2023-01-23 ENCOUNTER — TELEPHONE (OUTPATIENT)
Dept: GASTROENTEROLOGY | Facility: CLINIC | Age: 68
End: 2023-01-23
Payer: MEDICARE

## 2023-01-23 NOTE — TELEPHONE ENCOUNTER
----- Message from Lona Tinajero sent at 1/23/2023  9:48 AM CST -----  Regarding: Cancel and Reschedule  Contact: patient  Per phone call with patient, she stated that she would like to cancel the appointment on 02/01/2023 and to reschedule the appointment.  The caller will be out of town .  Please return call at [phone] (home).    Thanks,  SJ

## 2023-02-14 ENCOUNTER — TELEPHONE (OUTPATIENT)
Dept: UROLOGY | Facility: CLINIC | Age: 68
End: 2023-02-14
Payer: MEDICARE

## 2023-02-14 NOTE — TELEPHONE ENCOUNTER
Pt stated she went to urgent care over the weekend w/ uti was on bactrim for 3 days and feels its worsening. Appt made for thurs. Isaíasn

## 2023-02-14 NOTE — TELEPHONE ENCOUNTER
----- Message from Malina Mena sent at 2/14/2023  9:52 AM CST -----  Contact: Patient  Patient called to consult with nurse or staff regarding some kidney pain she is having. She wanted to speak with office regarding this and can be reached at [phone]. Thanks/MR

## 2023-02-16 ENCOUNTER — OFFICE VISIT (OUTPATIENT)
Dept: UROLOGY | Facility: CLINIC | Age: 68
End: 2023-02-16
Payer: MEDICARE

## 2023-02-16 DIAGNOSIS — R30.0 DYSURIA: ICD-10-CM

## 2023-02-16 DIAGNOSIS — R10.9 ABDOMINAL PAIN, UNSPECIFIED ABDOMINAL LOCATION: Primary | ICD-10-CM

## 2023-02-16 DIAGNOSIS — R82.90 ABNORMAL URINALYSIS: ICD-10-CM

## 2023-02-16 PROCEDURE — 99214 PR OFFICE/OUTPT VISIT, EST, LEVL IV, 30-39 MIN: ICD-10-PCS | Mod: S$GLB,,, | Performed by: UROLOGY

## 2023-02-16 PROCEDURE — 99214 OFFICE O/P EST MOD 30 MIN: CPT | Mod: S$GLB,,, | Performed by: UROLOGY

## 2023-02-16 RX ORDER — CEFDINIR 300 MG/1
300 CAPSULE ORAL 2 TIMES DAILY
Qty: 14 CAPSULE | Refills: 0 | Status: SHIPPED | OUTPATIENT
Start: 2023-02-16 | End: 2023-02-23

## 2023-02-16 NOTE — PROGRESS NOTES
Subjective:       Patient ID: Shira Franks is a 67 y.o. female.    Chief Complaint: kidney pain/possible uti      HPI:  67-year-old female generally feels very well has a remote history of stones now is hurting has nausea and kidney pain on the left side burning with urination she was on Bactrim for 3 days but this did not help    Past Medical History:   Past Medical History:   Diagnosis Date    Anemia     History of herpes simplex infection     Hypothyroidism, unspecified     Kidney stone     Lymphocytic colitis     Osteopenia     Vasovagal syncope     Vitamin D deficiency        Past Surgical Historical:   Past Surgical History:   Procedure Laterality Date    AUGMENTATION OF BREAST      BUNIONECTOMY Bilateral     COLONOSCOPY      6/24/14,  7/6/22    LIPOSUCTION OF THIGH      REMOVAL OF BREAST IMPLANT  11/2016    and mastopexy    REMOVAL OF BREAST IMPLANT      TRIGGER FINGER RELEASE Left 06/2020        Medications:   Medication List with Changes/Refills   Current Medications    ESTRADIOL (VAGIFEM) 10 MCG TAB    Place 1 tablet (10 mcg total) vaginally twice a week.    ESTROGENS,ESTERIFIED,-METHYLTESTOSTERONE 1.25-2.5MG (ESTRATEST) PER TABLET    Take 1 tablet by mouth once daily.    LEVOTHYROXINE (SYNTHROID) 88 MCG TABLET    Take 1 tablet (88 mcg total) by mouth before breakfast.    OXYBUTYNIN (DITROPAN-XL) 10 MG 24 HR TABLET    Take 1 tablet (10 mg total) by mouth once daily.    PANTOPRAZOLE (PROTONIX) 40 MG TABLET    Take 40 mg by mouth once daily.    VALACYCLOVIR (VALTREX) 1000 MG TABLET    Take 1 tablet (1,000 mg total) by mouth 3 (three) times daily. for 7 days        Past Social History:   Social History     Socioeconomic History    Marital status:    Tobacco Use    Smoking status: Never     Passive exposure: Never    Smokeless tobacco: Never   Substance and Sexual Activity    Alcohol use: Never    Drug use: Never    Sexual activity: Not Currently     Partners: Male     Birth control/protection: See  Surgical Hx     Comment: Hyst       Allergies: Review of patient's allergies indicates:  No Known Allergies     Family History:   Family History   Problem Relation Age of Onset    Heart failure Mother     Liver disease Mother     COPD Mother     Cancer Father     Heart failure Father     Lung cancer Father     Skin cancer Father         Review of Systems:  Review of Systems   Constitutional:  Negative for activity change and appetite change.   HENT:  Negative for congestion and dental problem.    Eyes:  Negative for pain and discharge.   Respiratory:  Negative for chest tightness and shortness of breath.    Cardiovascular:  Negative for chest pain.   Gastrointestinal:  Negative for abdominal distention and abdominal pain.   Endocrine: Negative for cold intolerance, heat intolerance and polyuria.   Genitourinary:  Negative for decreased urine volume, difficulty urinating, dyspareunia, dysuria, enuresis, flank pain, frequency, genital sores, hematuria, menstrual problem, pelvic pain, urgency, vaginal bleeding, vaginal discharge and vaginal pain.   Musculoskeletal:  Negative for back pain and neck pain.   Skin:  Negative for color change and rash.   Allergic/Immunologic: Negative for immunocompromised state.   Neurological:  Negative for dizziness.   Hematological:  Negative for adenopathy.   Psychiatric/Behavioral:  Negative for agitation, behavioral problems and confusion.      Physical Exam:  Physical Exam  Constitutional:       Appearance: She is well-developed.   HENT:      Head: Normocephalic and atraumatic.      Right Ear: External ear normal.      Left Ear: External ear normal.   Eyes:      Conjunctiva/sclera: Conjunctivae normal.   Neck:      Vascular: No JVD.   Cardiovascular:      Rate and Rhythm: Normal rate and regular rhythm.   Pulmonary:      Effort: Pulmonary effort is normal. No respiratory distress.      Breath sounds: Normal breath sounds. No wheezing.   Abdominal:      General: There is no  distension.      Palpations: Abdomen is soft.      Tenderness: There is no abdominal tenderness. There is no rebound.   Musculoskeletal:         General: Normal range of motion.      Cervical back: Normal range of motion.   Skin:     General: Skin is warm and dry.      Findings: No erythema or rash.   Neurological:      Mental Status: She is alert and oriented to person, place, and time.   Psychiatric:         Behavior: Behavior normal.       Assessment/Plan:       Problem List Items Addressed This Visit    None  Visit Diagnoses       Abdominal pain, unspecified abdominal location    -  Primary    Relevant Orders    CT Renal Stone Study ABD Pelvis WO    Dysuria        Relevant Orders    POCT Urinalysis (w/Micro Option)    Abnormal urinalysis        Relevant Orders    Urine culture               Recurrent UTI:  We will treat the patient with Omnicef we will culture today's urine in order a CT stone protocol for her flank pain

## 2023-02-18 LAB — URINE CULTURE, ROUTINE: NORMAL

## 2023-02-21 ENCOUNTER — TELEPHONE (OUTPATIENT)
Dept: UROLOGY | Facility: CLINIC | Age: 68
End: 2023-02-21
Payer: MEDICARE

## 2023-02-21 ENCOUNTER — PATIENT MESSAGE (OUTPATIENT)
Dept: UROLOGY | Facility: CLINIC | Age: 68
End: 2023-02-21
Payer: MEDICARE

## 2023-02-22 ENCOUNTER — PATIENT MESSAGE (OUTPATIENT)
Dept: UROLOGY | Facility: CLINIC | Age: 68
End: 2023-02-22
Payer: MEDICARE

## 2023-03-20 ENCOUNTER — PATIENT MESSAGE (OUTPATIENT)
Dept: OBSTETRICS AND GYNECOLOGY | Facility: CLINIC | Age: 68
End: 2023-03-20
Payer: MEDICARE

## 2023-03-20 DIAGNOSIS — N30.90 CYSTITIS: Primary | ICD-10-CM

## 2023-03-21 DIAGNOSIS — R31.9 HEMATURIA, UNSPECIFIED TYPE: Primary | ICD-10-CM

## 2023-03-21 LAB
AMORPH URATE CRY URNS QL MICRO: NEGATIVE
BACTERIA #/AREA URNS HPF: ABNORMAL /[HPF]
BILIRUB UR QL STRIP: NEGATIVE
CLARITY UR: CLEAR
COLOR UR: YELLOW
EPITHELIAL CELLS: ABNORMAL
GLUCOSE (UA): NEGATIVE MG/DL
KETONES UR QL STRIP: NEGATIVE MG/DL
LEUKOCYTE ESTERASE UR QL STRIP: ABNORMAL
MUCOUS THREADS URNS QL MICRO: NEGATIVE
NITRITE UR QL STRIP: NEGATIVE
OCCULT BLOOD: ABNORMAL
PH, URINE: 6.5 (ref 5–7.5)
PROT UR QL STRIP: NEGATIVE MG/DL
RBC/HPF: ABNORMAL
SP GR UR STRIP: 1 (ref 1–1.03)
UROBILINOGEN, URINE: NORMAL E.U./DL (ref 0–1)
WBC/HPF: ABNORMAL

## 2023-03-22 ENCOUNTER — TELEPHONE (OUTPATIENT)
Dept: UROLOGY | Facility: CLINIC | Age: 68
End: 2023-03-22
Payer: MEDICARE

## 2023-03-22 NOTE — TELEPHONE ENCOUNTER
She had already been set up for cystoscopy so call was not needed.----- Message from Joanne Hobbs sent at 3/22/2023 10:13 AM CDT -----  Pt set up cysto and wants a call back about getting an rx before

## 2023-03-31 ENCOUNTER — PATIENT MESSAGE (OUTPATIENT)
Dept: FAMILY MEDICINE | Facility: CLINIC | Age: 68
End: 2023-03-31
Payer: MEDICARE

## 2023-04-05 ENCOUNTER — PATIENT MESSAGE (OUTPATIENT)
Dept: PRIMARY CARE CLINIC | Facility: CLINIC | Age: 68
End: 2023-04-05
Payer: MEDICARE

## 2023-04-06 ENCOUNTER — OFFICE VISIT (OUTPATIENT)
Dept: PRIMARY CARE CLINIC | Facility: CLINIC | Age: 68
End: 2023-04-06
Payer: MEDICARE

## 2023-04-06 VITALS
HEIGHT: 63 IN | BODY MASS INDEX: 26.37 KG/M2 | SYSTOLIC BLOOD PRESSURE: 111 MMHG | OXYGEN SATURATION: 98 % | HEART RATE: 72 BPM | DIASTOLIC BLOOD PRESSURE: 72 MMHG | WEIGHT: 148.81 LBS

## 2023-04-06 DIAGNOSIS — Z00.00 WELLNESS EXAMINATION: Primary | ICD-10-CM

## 2023-04-06 DIAGNOSIS — F41.9 ANXIETY: ICD-10-CM

## 2023-04-06 DIAGNOSIS — E03.9 HYPOTHYROIDISM, UNSPECIFIED TYPE: ICD-10-CM

## 2023-04-06 DIAGNOSIS — R31.9 HEMATURIA, UNSPECIFIED TYPE: ICD-10-CM

## 2023-04-06 DIAGNOSIS — E78.5 HYPERLIPIDEMIA, UNSPECIFIED HYPERLIPIDEMIA TYPE: ICD-10-CM

## 2023-04-06 PROCEDURE — 99213 OFFICE O/P EST LOW 20 MIN: CPT | Mod: S$GLB,,, | Performed by: INTERNAL MEDICINE

## 2023-04-06 PROCEDURE — 99213 PR OFFICE/OUTPT VISIT, EST, LEVL III, 20-29 MIN: ICD-10-PCS | Mod: S$GLB,,, | Performed by: INTERNAL MEDICINE

## 2023-04-06 RX ORDER — HYDROXYZINE HYDROCHLORIDE 25 MG/1
25 TABLET, FILM COATED ORAL 3 TIMES DAILY PRN
Qty: 30 TABLET | Refills: 0 | Status: SHIPPED | OUTPATIENT
Start: 2023-04-06 | End: 2023-04-16

## 2023-04-06 NOTE — PROGRESS NOTES
"Subjective:      Patient ID: Shira Franks is a 67 y.o. female.    Chief Complaint: Follow-up (She states she needs something for her "melt downs." She does not want anything to take daily. She states she get's "anger/agitated." The patient states she has tried wellbutrin, lexapro. )    HPI    Past Medical History:   Diagnosis Date    Anemia     History of herpes simplex infection     Hypothyroidism, unspecified     Kidney stone     Lymphocytic colitis     Osteopenia     Vasovagal syncope     Vitamin D deficiency        Cardiologist is Dr Marcello Gomez and she was seeing her for complaint of dizziness/light-headedness which she states has resolved and feels it may have been stress from work. She has retired since January   She has been on valium, clorazepate and lorazepam in the past. She is wondering which one she needs/wants  States she has severe anger outbursts like slamming doors about once a week    Review of Systems   Constitutional:  Negative for chills and fever.   Respiratory:  Negative for cough, shortness of breath and wheezing.    Cardiovascular:  Negative for chest pain, palpitations and leg swelling.   Gastrointestinal:  Negative for abdominal pain, constipation, diarrhea, nausea and vomiting.   Genitourinary:  Negative for dysuria.   Musculoskeletal:  Negative for falls.   Skin:  Negative for rash.   Neurological:  Negative for dizziness and headaches.   Psychiatric/Behavioral:  The patient is nervous/anxious.    Objective:     Physical Exam  Vitals reviewed.   Constitutional:       Appearance: Normal appearance.   HENT:      Head: Normocephalic.   Eyes:      Extraocular Movements: Extraocular movements intact.      Conjunctiva/sclera: Conjunctivae normal.      Pupils: Pupils are equal, round, and reactive to light.   Musculoskeletal:         General: Normal range of motion.   Skin:     General: Skin is warm.      Capillary Refill: Capillary refill takes less than 2 seconds.   Neurological:      General: No " "focal deficit present.      Mental Status: She is alert and oriented to person, place, and time.   Psychiatric:         Mood and Affect: Mood normal.     /72 (BP Location: Right arm, Patient Position: Sitting, BP Method: Medium (Automatic))   Pulse 72   Ht 5' 3" (1.6 m)   Wt 67.5 kg (148 lb 12.8 oz)   SpO2 98%   BMI 26.36 kg/m²     Assessment:       ICD-10-CM ICD-9-CM   1. Wellness examination  Z00.00 V70.0   2. Anxiety  F41.9 300.00   3. Hyperlipidemia, unspecified hyperlipidemia type  E78.5 272.4   4. Hypothyroidism, unspecified type  E03.9 244.9   5. Hematuria, unspecified type  R31.9 599.70       Plan:     Medication List with Changes/Refills   New Medications    HYDROXYZINE HCL (ATARAX) 25 MG TABLET    Take 1 tablet (25 mg total) by mouth 3 (three) times daily as needed for Anxiety.   Current Medications    LEVOTHYROXINE (SYNTHROID) 88 MCG TABLET    Take 1 tablet (88 mcg total) by mouth before breakfast.    OXYBUTYNIN (DITROPAN-XL) 10 MG 24 HR TABLET    Take 1 tablet (10 mg total) by mouth once daily.    PANTOPRAZOLE (PROTONIX) 40 MG TABLET    TAKE 1 TABLET (40 MG TOTAL) BY MOUTH ONCE DAILY.   Discontinued Medications    ESTRADIOL (VAGIFEM) 10 MCG TAB    Place 1 tablet (10 mcg total) vaginally twice a week.    ESTROGENS,ESTERIFIED,-METHYLTESTOSTERONE 1.25-2.5MG (ESTRATEST) PER TABLET    Take 1 tablet by mouth once daily.    VALACYCLOVIR (VALTREX) 1000 MG TABLET    Take 1 tablet (1,000 mg total) by mouth 3 (three) times daily. for 7 days        1. Wellness examination  -     Basic Metabolic Panel; Future; Expected date: 04/06/2023  -     Lipid Panel; Future; Expected date: 04/06/2023    2. Anxiety  -     hydrOXYzine HCL (ATARAX) 25 MG tablet; Take 1 tablet (25 mg total) by mouth 3 (three) times daily as needed for Anxiety.  Dispense: 30 tablet; Refill: 0    3. Hyperlipidemia, unspecified hyperlipidemia type  -     Lipid Panel; Future; Expected date: 04/06/2023    4. Hypothyroidism, unspecified " type    5. Hematuria, unspecified type         Future Appointments   Date Time Provider Department Center   4/13/2023  2:00 PM Wing Rai MD [facility] UROLOGY  [address]   10/6/2023  9:40 AM Shelia Knott MD [facility]  [address]   10/31/2023 12:30 PM Isabel Saavedra MD [facility] GASTRO  [address]   11/8/2023 11:00 AM Stella Moody NP [facility] UROLOGY  [address]         She will try vistaril and let me know if it helps or not, reviewed side effects

## 2023-04-12 ENCOUNTER — TELEPHONE (OUTPATIENT)
Dept: UROLOGY | Facility: CLINIC | Age: 68
End: 2023-04-12
Payer: MEDICARE

## 2023-04-13 ENCOUNTER — PROCEDURE VISIT (OUTPATIENT)
Dept: UROLOGY | Facility: CLINIC | Age: 68
End: 2023-04-13
Payer: MEDICARE

## 2023-04-13 VITALS
WEIGHT: 149.13 LBS | DIASTOLIC BLOOD PRESSURE: 64 MMHG | HEIGHT: 63 IN | TEMPERATURE: 98 F | RESPIRATION RATE: 18 BRPM | BODY MASS INDEX: 26.42 KG/M2 | HEART RATE: 69 BPM | SYSTOLIC BLOOD PRESSURE: 126 MMHG

## 2023-04-13 DIAGNOSIS — R31.9 HEMATURIA, UNSPECIFIED TYPE: ICD-10-CM

## 2023-04-13 PROCEDURE — 52000 CYSTOURETHROSCOPY: CPT | Mod: S$GLB,,, | Performed by: UROLOGY

## 2023-04-13 PROCEDURE — 52000 CYSTOSCOPY: ICD-10-PCS | Mod: S$GLB,,, | Performed by: UROLOGY

## 2023-04-13 NOTE — PROCEDURES
Cystoscopy    Date/Time: 4/13/2023 2:00 PM  Performed by: Wing Rai MD  Authorized by: Wing Rai MD     Consent Done?:  Yes (Written)  Timeout: prior to procedure the correct patient, procedure, and site was verified    Prep: patient was prepped and draped in usual sterile fashion    Anesthesia:  Intraurethral instillation  Indications: hematuria    Position:  Supine  Anesthesia:  Intraurethral instillation  Patient sedated?: No    Preparation: Patient was prepped and draped in usual sterile fashion    Scope type:  Flexible cystoscope  External exam normal: Yes    Urethra normal: Yes    Comments:      The patient was brought to the procedure room placed on the table padded prepped and draped in usual sterile fashion in supine position. The cystoscope was inserted into the urethra and advanced the urethra was normal. The bladder was entered and inspected, it was found to be free of tumor stone or foreign body.  Bilateral ureteral orifices were identified and noted to be normal in appearance with clear efflux of urine at this point the scope was removed the patient tolerated the procedure well there were no complications.  Pelvic exam was performed and no abnormalities were noted.

## 2023-04-21 LAB
ANION GAP SERPL CALC-SCNC: 5 MMOL/L (ref 3–11)
BUN SERPL-MCNC: 12 MG/DL (ref 7–18)
BUN/CREAT SERPL: 15 RATIO (ref 7–18)
CALCIUM SERPL-MCNC: 8.6 MG/DL (ref 8.8–10.5)
CHLORIDE SERPL-SCNC: 103 MMOL/L (ref 100–108)
CHOLEST SERPL-MSCNC: 206 MG/DL
CO2 SERPL-SCNC: 31 MMOL/L (ref 21–32)
CREAT SERPL-MCNC: 0.8 MG/DL (ref 0.55–1.02)
GFR ESTIMATION: > 60
GLUCOSE SERPL-MCNC: 100 MG/DL (ref 70–110)
HDL/CHOLESTEROL RATIO: 3.4 RATIO
HDLC SERPL-MCNC: 61 MG/DL (ref 39–96)
LDLC SERPL CALC-MCNC: 125.6 MG/DL
POTASSIUM SERPL-SCNC: 3.8 MMOL/L (ref 3.6–5.2)
SODIUM BLD-SCNC: 139 MMOL/L (ref 135–145)
TRIGL SERPL-MCNC: 97 MG/DL (ref 30–200)
VLDL CHOLESTEROL: 19 MG/DL (ref 0–40)

## 2023-04-24 ENCOUNTER — PATIENT MESSAGE (OUTPATIENT)
Dept: PRIMARY CARE CLINIC | Facility: CLINIC | Age: 68
End: 2023-04-24
Payer: MEDICARE

## 2023-05-05 ENCOUNTER — TELEPHONE (OUTPATIENT)
Dept: UROLOGY | Facility: CLINIC | Age: 68
End: 2023-05-05
Payer: MEDICARE

## 2023-05-05 NOTE — TELEPHONE ENCOUNTER
Called patient to discuss results and recommendations. Patient stated had cystoscopy done already, upon checking she did on 04/13/2023. Explained to patient would send message to Dr. Rai and if any other recommendations would let her know. Patient verbalized understanding.

## 2023-05-05 NOTE — TELEPHONE ENCOUNTER
----- Message from Stella Moody NP sent at 5/5/2023 12:28 PM CDT -----  Please notify the patient of no abnormal findings on her CT.  Patient should have cystoscopy also to complete hematuria workup.   September 30, 2020      [address] Menendez  [address] [facility] [address]        Dear ,    We are writing to inform you of your test results.      These test results are considered normal. Ok for surgery.   If you have further questions regarding these tests, please use the [employer] system to send a message to our nurse triage staff.   You can also schedule a follow up appointment with me.     Resulted Orders   CBC with platelets   Result Value Ref Range    WBC 4.4 4.0 - 11.0 10e9/L    RBC Count 4.44 4.4 - 5.9 10e12/L    Hemoglobin 15.2 13.3 - 17.7 g/dL    Hematocrit 43.8 40.0 - 53.0 %    MCV 99 78 - 100 fl    MCH 34.2 (H) 26.5 - 33.0 pg    MCHC 34.7 31.5 - 36.5 g/dL    RDW 13.0 10.0 - 15.0 %    Platelet Count 229 150 - 450 10e9/L   Basic metabolic panel  (Ca, Cl, CO2, Creat, Gluc, K, Na, BUN)   Result Value Ref Range    Sodium 136 133 - 144 mmol/L    Potassium 3.8 3.4 - 5.3 mmol/L    Chloride 103 94 - 109 mmol/L    Carbon Dioxide 29 20 - 32 mmol/L    Anion Gap 4 3 - 14 mmol/L    Glucose 97 70 - 99 mg/dL      Comment:      Non Fasting    Urea Nitrogen 16 7 - 30 mg/dL    Creatinine 0.92 0.66 - 1.25 mg/dL    GFR Estimate 86 >60 mL/min/[1.73_m2]      Comment:      Non  GFR Calc  Starting 12/18/2018, serum creatinine based estimated GFR (eGFR) will be   calculated using the Chronic Kidney Disease Epidemiology Collaboration   (CKD-EPI) equation.      GFR Estimate If Black >90 >60 mL/min/[1.73_m2]      Comment:       GFR Calc  Starting 12/18/2018, serum creatinine based estimated GFR (eGFR) will be   calculated using the Chronic Kidney Disease Epidemiology Collaboration   (CKD-EPI) equation.      Calcium 9.3 8.5 - 10.1 mg/dL   INR   Result Value Ref Range    INR 1.00 0.86 - 1.14       If you have any questions or concerns, please call the clinic at the number listed above.       Sincerely,        Courtney Sheriff, ABBY CNP/nk

## 2023-05-23 ENCOUNTER — PATIENT MESSAGE (OUTPATIENT)
Dept: PRIMARY CARE CLINIC | Facility: CLINIC | Age: 68
End: 2023-05-23
Payer: MEDICARE

## 2023-05-25 ENCOUNTER — PATIENT MESSAGE (OUTPATIENT)
Dept: PRIMARY CARE CLINIC | Facility: CLINIC | Age: 68
End: 2023-05-25
Payer: MEDICARE

## 2023-05-30 DIAGNOSIS — F41.9 ANXIETY: Primary | ICD-10-CM

## 2023-05-30 RX ORDER — CLORAZEPATE DIPOTASSIUM 7.5 MG/1
7.5 TABLET ORAL 2 TIMES DAILY
Qty: 60 TABLET | Refills: 0 | Status: SHIPPED | OUTPATIENT
Start: 2023-05-30 | End: 2023-06-29

## 2023-09-08 LAB
ANION GAP SERPL CALC-SCNC: 9 MMOL/L (ref 8–17)
BUN SERPL-MCNC: 13.5 MG/DL (ref 8–23)
BUN/CREAT SERPL: 16.7 (ref 6–20)
CALCIUM SERPL-MCNC: 9.8 MG/DL (ref 8.6–10.2)
CHLORIDE SERPL-SCNC: 109 MMOL/L (ref 98–107)
CHOLEST SERPL-MSCNC: 201 MG/DL (ref 100–200)
CO2 SERPL-SCNC: 29 MMOL/L (ref 22–29)
CREAT SERPL-MCNC: 0.8 MG/DL (ref 0.5–0.9)
EST. GFR  (NO RACE VARIABLE): 78.53
GLUCOSE SERPL-MCNC: 100 MG/DL (ref 82–115)
HBA1C MFR BLD: 5.4 % (ref 4–6)
HDLC SERPL-MCNC: 55 MG/DL
LDL/HDL RATIO: 2.3 (ref 1–3)
LDLC SERPL CALC-MCNC: 126 MG/DL (ref 0–100)
POTASSIUM SERPL-SCNC: 5.5 MMOL/L (ref 3.5–5.1)
SODIUM BLD-SCNC: 147 MMOL/L (ref 136–145)
TRIGL SERPL-MCNC: 98 MG/DL (ref 0–150)

## 2023-09-19 RX ORDER — PANTOPRAZOLE SODIUM 40 MG/1
TABLET, DELAYED RELEASE ORAL
Qty: 30 TABLET | Refills: 0 | Status: SHIPPED | OUTPATIENT
Start: 2023-09-19 | End: 2023-10-24

## 2023-09-27 ENCOUNTER — OFFICE VISIT (OUTPATIENT)
Dept: PRIMARY CARE CLINIC | Facility: CLINIC | Age: 68
End: 2023-09-27
Payer: MEDICARE

## 2023-09-27 ENCOUNTER — CLINICAL SUPPORT (OUTPATIENT)
Dept: OBSTETRICS AND GYNECOLOGY | Facility: CLINIC | Age: 68
End: 2023-09-27
Payer: MEDICARE

## 2023-09-27 VITALS
HEART RATE: 66 BPM | WEIGHT: 144.19 LBS | OXYGEN SATURATION: 99 % | HEIGHT: 63 IN | DIASTOLIC BLOOD PRESSURE: 64 MMHG | BODY MASS INDEX: 25.55 KG/M2 | SYSTOLIC BLOOD PRESSURE: 110 MMHG

## 2023-09-27 DIAGNOSIS — E03.9 HYPOTHYROIDISM, UNSPECIFIED TYPE: Primary | ICD-10-CM

## 2023-09-27 DIAGNOSIS — Z01.89 ROUTINE LAB DRAW: Primary | ICD-10-CM

## 2023-09-27 DIAGNOSIS — E87.5 HYPERKALEMIA: ICD-10-CM

## 2023-09-27 PROCEDURE — 36415 COLL VENOUS BLD VENIPUNCTURE: CPT | Mod: S$GLB,,, | Performed by: INTERNAL MEDICINE

## 2023-09-27 PROCEDURE — 99214 OFFICE O/P EST MOD 30 MIN: CPT | Mod: S$GLB,,, | Performed by: INTERNAL MEDICINE

## 2023-09-27 PROCEDURE — 36415 PR COLLECTION VENOUS BLOOD,VENIPUNCTURE: ICD-10-PCS | Mod: S$GLB,,, | Performed by: INTERNAL MEDICINE

## 2023-09-27 PROCEDURE — 99214 PR OFFICE/OUTPT VISIT, EST, LEVL IV, 30-39 MIN: ICD-10-PCS | Mod: S$GLB,,, | Performed by: INTERNAL MEDICINE

## 2023-09-27 NOTE — PROGRESS NOTES
Subjective:      Patient ID: Shira Franks is a 68 y.o. female.    Chief Complaint: Follow-up (The patient does have lab results with her. )    HPI      Past Medical History:   Diagnosis Date    Anemia     History of herpes simplex infection     Hypothyroidism, unspecified     Kidney stone     Lymphocytic colitis     Osteopenia     Vasovagal syncope     Vitamin D deficiency      Cardiologist is Dr Marcello Gomez and she was seeing her for complaint of dizziness/light-headedness which she states has resolved and feels it may have been stress from work. She has retired since January   She has been on valium, clorazepate and lorazepam in the past. Now on clorazepate which she takes as needed    No acute complaints    Review of Systems   Constitutional:  Negative for chills and fever.   HENT:  Negative for hearing loss.    Eyes:  Negative for blurred vision.   Respiratory:  Negative for cough, shortness of breath and wheezing.    Cardiovascular:  Negative for chest pain, palpitations and leg swelling.   Gastrointestinal:  Negative for abdominal pain, blood in stool, constipation, diarrhea, melena, nausea and vomiting.   Genitourinary:  Negative for dysuria, frequency and urgency.   Musculoskeletal:  Negative for falls.   Skin:  Negative for rash.   Neurological:  Negative for dizziness and headaches.   Endo/Heme/Allergies:  Does not bruise/bleed easily.   Psychiatric/Behavioral:  Negative for depression. The patient is nervous/anxious.      Objective:     Physical Exam  Vitals reviewed.   Constitutional:       Appearance: Normal appearance.   HENT:      Head: Normocephalic.      Mouth/Throat:      Mouth: Mucous membranes are moist.      Pharynx: Oropharynx is clear.   Eyes:      Extraocular Movements: Extraocular movements intact.      Conjunctiva/sclera: Conjunctivae normal.      Pupils: Pupils are equal, round, and reactive to light.   Cardiovascular:      Rate and Rhythm: Normal rate and regular rhythm.   Pulmonary:       Effort: Pulmonary effort is normal.      Breath sounds: Normal breath sounds.   Abdominal:      General: Bowel sounds are normal.   Musculoskeletal:      Right lower leg: No edema.      Left lower leg: No edema.   Skin:     General: Skin is warm.      Capillary Refill: Capillary refill takes less than 2 seconds.   Neurological:      General: No focal deficit present.      Mental Status: She is alert.   Psychiatric:         Mood and Affect: Mood normal.       Assessment:       ICD-10-CM ICD-9-CM   1. Hypothyroidism, unspecified type  E03.9 244.9   2. Hyperkalemia  E87.5 276.7       Plan:     Medication List with Changes/Refills   Current Medications    CLORAZEPATE (TRANXENE) 7.5 MG TAB    Take 1 tablet (7.5 mg total) by mouth 2 (two) times daily.    LEVOTHYROXINE (SYNTHROID) 88 MCG TABLET    Take 1 tablet (88 mcg total) by mouth before breakfast.    OXYBUTYNIN (DITROPAN-XL) 10 MG 24 HR TABLET    Take 1 tablet (10 mg total) by mouth once daily.    PANTOPRAZOLE (PROTONIX) 40 MG TABLET    TAKE 1 TABLET (40 MG TOTAL) BY MOUTH ONCE DAILY.        1. Hypothyroidism, unspecified type  -     TSH; Future; Expected date: 09/27/2023  -     T4, Free; Future; Expected date: 09/27/2023    2. Hyperkalemia  -     Basic Metabolic Panel; Future; Expected date: 09/27/2023               Future Appointments   Date Time Provider Department Center   10/31/2023 12:30 PM Isaebl Saavedra MD [facility] GASTRO  [address]   11/8/2023 11:00 AM Stella Moody NP [facility] UROLOGY  [address]   3/27/2024  9:20 AM Shelia Knott MD [facility] [address]

## 2023-09-28 LAB
ANION GAP SERPL CALC-SCNC: 4 MMOL/L (ref 3–11)
BUN SERPL-MCNC: 13 MG/DL (ref 7–18)
BUN/CREAT SERPL: 15.11 RATIO
CALCIUM SERPL-MCNC: 9.4 MG/DL (ref 8.5–10.1)
CHLORIDE SERPL-SCNC: 105 MMOL/L (ref 98–107)
CO2 SERPL-SCNC: 29 MMOL/L (ref 21–32)
CREAT SERPL-MCNC: 0.86 MG/DL (ref 0.55–1.02)
GFR ESTIMATION: > 60
GLUCOSE SERPL-MCNC: 103 MG/DL (ref 74–106)
POTASSIUM SERPL-SCNC: 4 MMOL/L (ref 3.5–5.1)
SODIUM BLD-SCNC: 138 MMOL/L (ref 131–143)
T4 FREE SP9 P CHAL SERPL-SCNC: 1.1 NG/DL (ref 0.76–1.46)
TSH SERPL DL<=0.005 MIU/L-ACNC: 0.56 UIU/ML (ref 0.36–3.74)

## 2023-10-02 ENCOUNTER — PATIENT MESSAGE (OUTPATIENT)
Dept: PRIMARY CARE CLINIC | Facility: CLINIC | Age: 68
End: 2023-10-02
Payer: MEDICARE

## 2023-10-03 ENCOUNTER — PATIENT OUTREACH (OUTPATIENT)
Dept: ADMINISTRATIVE | Facility: HOSPITAL | Age: 68
End: 2023-10-03
Payer: MEDICARE

## 2023-10-24 DIAGNOSIS — E03.9 HYPOTHYROIDISM, UNSPECIFIED TYPE: ICD-10-CM

## 2023-10-24 RX ORDER — LEVOTHYROXINE SODIUM 88 UG/1
88 TABLET ORAL
Qty: 90 TABLET | Refills: 2 | Status: SHIPPED | OUTPATIENT
Start: 2023-10-24

## 2023-10-24 RX ORDER — PANTOPRAZOLE SODIUM 40 MG/1
TABLET, DELAYED RELEASE ORAL
Qty: 90 TABLET | Refills: 0 | Status: SHIPPED | OUTPATIENT
Start: 2023-10-24 | End: 2024-02-29 | Stop reason: SDUPTHER

## 2023-10-31 ENCOUNTER — OFFICE VISIT (OUTPATIENT)
Dept: GASTROENTEROLOGY | Facility: CLINIC | Age: 68
End: 2023-10-31
Payer: MEDICARE

## 2023-10-31 VITALS
WEIGHT: 141.38 LBS | DIASTOLIC BLOOD PRESSURE: 75 MMHG | BODY MASS INDEX: 25.05 KG/M2 | HEART RATE: 74 BPM | OXYGEN SATURATION: 97 % | HEIGHT: 63 IN | SYSTOLIC BLOOD PRESSURE: 123 MMHG

## 2023-10-31 DIAGNOSIS — R13.19 ESOPHAGEAL DYSPHAGIA: Primary | ICD-10-CM

## 2023-10-31 DIAGNOSIS — K21.9 GASTROESOPHAGEAL REFLUX DISEASE, UNSPECIFIED WHETHER ESOPHAGITIS PRESENT: ICD-10-CM

## 2023-10-31 DIAGNOSIS — K52.832 LYMPHOCYTIC COLITIS: ICD-10-CM

## 2023-10-31 DIAGNOSIS — R10.30 LOWER ABDOMINAL PAIN: ICD-10-CM

## 2023-10-31 DIAGNOSIS — Z12.11 SCREENING FOR COLON CANCER: ICD-10-CM

## 2023-10-31 PROCEDURE — 99214 OFFICE O/P EST MOD 30 MIN: CPT | Mod: S$GLB,,, | Performed by: INTERNAL MEDICINE

## 2023-10-31 PROCEDURE — 99214 PR OFFICE/OUTPT VISIT, EST, LEVL IV, 30-39 MIN: ICD-10-PCS | Mod: S$GLB,,, | Performed by: INTERNAL MEDICINE

## 2023-10-31 RX ORDER — MAGNESIUM 30 MG
TABLET ORAL ONCE
COMMUNITY

## 2023-10-31 RX ORDER — MULTIVIT WITH MINERALS/HERBS
1 TABLET ORAL DAILY
COMMUNITY

## 2023-10-31 NOTE — LETTER
October 31, 2023        Shelia Knott MD  [address]  [address]  [place] [address]             [facility]  [address] [address]  Phone: [phone]  Fax: [phone]   Patient: Shira Franks   MR Number: [id number]   YOB: 1955   Date of Visit: 10/31/2023       Dear Dr. Knott:    Thank you for referring Shira Franks to me for evaluation. Below are the relevant portions of my assessment and plan of care.            If you have questions, please do not hesitate to call me. I look forward to following Shira along with you.    Sincerely,      Isabel Saavedra MD           CC  No Recipients

## 2023-10-31 NOTE — PROGRESS NOTES
Clinic Note    Reason for visit:  The primary encounter diagnosis was Esophageal dysphagia. Diagnoses of Lymphocytic colitis, Lower abdominal pain, Gastroesophageal reflux disease, unspecified whether esophagitis present, and Screening for colon cancer were also pertinent to this visit.    PCP: Shelia Knott       HPI:  This is a 68 y.o. female who is here for a follow up. Patient dysphagia resolved with pantoprazole 40mg daily. If she misses dose for 1-2 days symptoms return. Denies reflux. She has BM every 1-2 days. She did take pepto-bismol x 8 weeks and states felt much better after pepto. She describes her BM as loose prior to pepto. Not necessarily diarrhea. She has been having constant lower abdominal pain that started about 2 weeks ago. She has been treated twice for UTIs since the abdominal pain started. She recently finished Macrobid but is still having the lower abdominal pain. She has appt with 11/8/2023 with Dr. Rai.    5/5/2023 CT A/P w/ and w/o: normal     Labs 6/2022: CBC wnl, Hgb nl, Ferr/Folate/LDH/Fe/%sat/TIBC/Retic nl. B12 1469H. Hb elect nl.    EGD/Colonoscopy 7/6/2022: Stool was seen in the whole colon. Significantly limited views of the colonic mucosa. Small IH/EH. DBx nl, GBx react w/o Hp, GEBx reflux, CBx LC. Colonoscopy should be at her 10 year due date of 6/24/2024 give the stool throughout the colon in this colonoscopy    EGD 11/15/2018: GBx wnl, GEBx reflux, EBx reflux.  Colonoscopy with FXB 6/24/2014: normal, repeat in 10 yr    Review of Systems   Constitutional:  Negative for fatigue, fever and unexpected weight change.   HENT:  Negative for mouth sores, postnasal drip, sore throat and trouble swallowing.    Eyes:  Negative for pain, discharge and eye dryness.   Respiratory:  Negative for apnea, cough, choking, chest tightness, shortness of breath and wheezing.    Cardiovascular:  Negative for chest pain, palpitations and leg swelling.   Gastrointestinal:  Positive for abdominal  pain. Negative for abdominal distention, anal bleeding, blood in stool, change in bowel habit, constipation, diarrhea, nausea, rectal pain, vomiting, reflux and fecal incontinence.   Genitourinary:  Negative for bladder incontinence, dysuria and hematuria.   Musculoskeletal:  Negative for arthralgias, back pain and joint swelling.   Integumentary:  Negative for color change and rash.   Allergic/Immunologic: Negative for environmental allergies and food allergies.   Neurological:  Negative for seizures and headaches.   Hematological:  Negative for adenopathy. Does not bruise/bleed easily.        Past Medical History:   Diagnosis Date    Anemia     History of herpes simplex infection     Hypothyroidism, unspecified     Kidney stone     Lymphocytic colitis     Osteopenia     Vasovagal syncope     Vitamin D deficiency      Past Surgical History:   Procedure Laterality Date    AUGMENTATION OF BREAST      BUNIONECTOMY Bilateral     COLONOSCOPY      6/24/14,  7/6/22    LIPOSUCTION OF THIGH      REMOVAL OF BREAST IMPLANT  11/2016    and mastopexy    REMOVAL OF BREAST IMPLANT      TRIGGER FINGER RELEASE Left 06/2020    x2     Family History   Problem Relation Age of Onset    Heart failure Mother     Liver disease Mother     COPD Mother     Cancer Father     Heart failure Father     Lung cancer Father     Skin cancer Father      Social History     Tobacco Use    Smoking status: Never     Passive exposure: Never    Smokeless tobacco: Never   Substance Use Topics    Alcohol use: Never    Drug use: Never     Review of patient's allergies indicates:  No Known Allergies     Medication List with Changes/Refills   Current Medications    B COMPLEX VITAMINS TABLET    Take 1 tablet by mouth once daily.    CLORAZEPATE (TRANXENE) 7.5 MG TAB    Take 1 tablet (7.5 mg total) by mouth 2 (two) times daily.    LEVOTHYROXINE (SYNTHROID) 88 MCG TABLET    TAKE 1 TABLET (88 MCG TOTAL) BY MOUTH BEFORE BREAKFAST.    MAGNESIUM 30 MG TAB    Take by  "mouth once.    OXYBUTYNIN (DITROPAN-XL) 10 MG 24 HR TABLET    Take 1 tablet (10 mg total) by mouth once daily.    PANTOPRAZOLE (PROTONIX) 40 MG TABLET    TAKE 1 TABLET (40 MG TOTAL) BY MOUTH ONCE DAILY.         Vital Signs:  /75   Pulse 74   Ht 5' 3" (1.6 m)   Wt 64.1 kg (141 lb 6.4 oz)   SpO2 97%   BMI 25.05 kg/m²         Physical Exam  Vitals reviewed.   Constitutional:       General: She is awake. She is not in acute distress.     Appearance: Normal appearance. She is well-developed. She is not ill-appearing, toxic-appearing or diaphoretic.   HENT:      Head: Normocephalic and atraumatic.      Nose: Nose normal.      Mouth/Throat:      Mouth: Mucous membranes are moist.      Pharynx: Oropharynx is clear. No oropharyngeal exudate or posterior oropharyngeal erythema.   Eyes:      General: Lids are normal. Gaze aligned appropriately. No scleral icterus.        Right eye: No discharge.         Left eye: No discharge.      Conjunctiva/sclera: Conjunctivae normal.   Neck:      Trachea: Trachea normal.   Cardiovascular:      Rate and Rhythm: Normal rate and regular rhythm.      Pulses:           Radial pulses are 2+ on the right side and 2+ on the left side.   Pulmonary:      Effort: Pulmonary effort is normal. No respiratory distress.      Breath sounds: No stridor. No wheezing.   Chest:      Chest wall: No tenderness.   Abdominal:      General: Bowel sounds are normal. There is no distension.      Palpations: Abdomen is soft. There is no fluid wave, hepatomegaly or mass.      Tenderness: There is abdominal tenderness in the suprapubic area. There is no guarding or rebound.   Musculoskeletal:         General: No tenderness or deformity.      Cervical back: Full passive range of motion without pain and neck supple. No tenderness.      Right lower leg: No edema.      Left lower leg: No edema.   Lymphadenopathy:      Cervical: No cervical adenopathy.   Skin:     General: Skin is warm and dry.      Capillary " Refill: Capillary refill takes less than 2 seconds.      Coloration: Skin is not cyanotic, jaundiced or pale.   Neurological:      General: No focal deficit present.      Mental Status: She is alert and oriented to person, place, and time.      Motor: No tremor.   Psychiatric:         Attention and Perception: Attention normal.         Mood and Affect: Mood and affect normal.         Speech: Speech normal.         Behavior: Behavior normal. Behavior is cooperative.            All of the data above and below has been reviewed by myself and any further interpretations will be reflected in the assessment and plan.   The data includes review of external notes, and independent interpretation of lab results, procedures, x-rays, and imaging reports.      Assessment:  Esophageal dysphagia    Lymphocytic colitis  -     Ambulatory Referral to External Surgery    Lower abdominal pain    Gastroesophageal reflux disease, unspecified whether esophagitis present    Screening for colon cancer  -     Ambulatory Referral to External Surgery      Dysphagia- since on panto 40mg symptoms have resolved. Does note if she misses a dose for 1-2 days symptoms return.  H/o of lymphocytic colitis- currently with regular BM.  Lower abdominal pain- recent UTI with AB x 2- appt with Dr. Rai. Pain was mainly suprapubic.   Colonoscopy due 6/24/2024- aggressive prep. 2 day CL and miralax daily week before      Recommendations:    Schedule colonoscopy 6/2024  Continue with pantoprazole 40mg daily.      Risks, benefits, and alternatives of medical management, any associated procedures, and/or treatment discussed with the patient. Patient given opportunity to ask questions and voices understanding. Patient has elected to proceed with the recommended care modalities as discussed.    Instructed patient to notify my office if they have not been contacted within two weeks after any procedures, submitting any samples (biopsies, blood, stool, urine,  etc.) or after any imaging (X-ray, CT, MRI, etc.).     Follow up in about 1 year (around 10/31/2024).    Order summary:  Orders Placed This Encounter   Procedures    Ambulatory Referral to External Surgery      This assessment, plan, and documentation was performed in collaboration with Brittni Palomo NP.     This document may have been created using a voice recognition transcribing system. Incorrect words or phrases may have been missed during proofreading. Please interpret accordingly or contact me for clarification.     Isabel Saavedra MD

## 2023-10-31 NOTE — PATIENT INSTRUCTIONS
Schedule colonoscopy 6/2024  Continue with pantoprazole 40mg daily.    Please notify my office if you have not been contacted within two weeks after any procedures, submitting any samples (biopsies, blood, stool, urine, etc.) or after any imaging (X-ray, CT, MRI, etc.).

## 2023-11-06 ENCOUNTER — OFFICE VISIT (OUTPATIENT)
Dept: UROLOGY | Facility: CLINIC | Age: 68
End: 2023-11-06
Payer: MEDICARE

## 2023-11-06 VITALS
OXYGEN SATURATION: 97 % | BODY MASS INDEX: 24.8 KG/M2 | WEIGHT: 140 LBS | TEMPERATURE: 98 F | DIASTOLIC BLOOD PRESSURE: 70 MMHG | HEART RATE: 81 BPM | HEIGHT: 63 IN | SYSTOLIC BLOOD PRESSURE: 130 MMHG

## 2023-11-06 DIAGNOSIS — R10.9 ABDOMINAL PAIN, UNSPECIFIED ABDOMINAL LOCATION: ICD-10-CM

## 2023-11-06 DIAGNOSIS — R82.90 ABNORMAL URINALYSIS: Primary | ICD-10-CM

## 2023-11-06 LAB
BILIRUBIN, UA POC OHS: NEGATIVE
BLOOD, UA POC OHS: ABNORMAL
CLARITY, UA POC OHS: CLEAR
COLOR, UA POC OHS: YELLOW
GLUCOSE, UA POC OHS: NEGATIVE
KETONES, UA POC OHS: NEGATIVE
LEUKOCYTES, UA POC OHS: NEGATIVE
NITRITE, UA POC OHS: NEGATIVE
PH, UA POC OHS: 7
PROTEIN, UA POC OHS: NEGATIVE
SPECIFIC GRAVITY, UA POC OHS: 1.01
UROBILINOGEN, UA POC OHS: 0.2

## 2023-11-06 PROCEDURE — 81003 URINALYSIS AUTO W/O SCOPE: CPT | Mod: QW,S$GLB,, | Performed by: FAMILY MEDICINE

## 2023-11-06 PROCEDURE — 99214 PR OFFICE/OUTPT VISIT, EST, LEVL IV, 30-39 MIN: ICD-10-PCS | Mod: S$GLB,,, | Performed by: FAMILY MEDICINE

## 2023-11-06 PROCEDURE — 81003 POCT URINALYSIS(INSTRUMENT): ICD-10-PCS | Mod: QW,S$GLB,, | Performed by: FAMILY MEDICINE

## 2023-11-06 PROCEDURE — 99214 OFFICE O/P EST MOD 30 MIN: CPT | Mod: S$GLB,,, | Performed by: FAMILY MEDICINE

## 2023-11-06 NOTE — PROGRESS NOTES
Subjective:       Patient ID: Shira Franks is a 68 y.o. female.    Chief Complaint: Urinary Tract Infection and Annual Exam      HPI:  68-year-old female with history of nephrolithiasis as well as urinary frequency some urgency and which she was previously prescribed Ditropan for.  Patient discontinued Ditropan due to side effects and no improvement in symptoms.  She recently experienced a UTI that was treated by her PCP.  Doing well on not experiencing any symptoms at this time.      Past Medical History:   Past Medical History:   Diagnosis Date    Anemia     History of herpes simplex infection     Hypothyroidism, unspecified     Kidney stone     Lymphocytic colitis     Osteopenia     Vasovagal syncope     Vitamin D deficiency        Past Surgical Historical:   Past Surgical History:   Procedure Laterality Date    AUGMENTATION OF BREAST      BUNIONECTOMY Bilateral     COLONOSCOPY      6/24/14,  7/6/22    LIPOSUCTION OF THIGH      REMOVAL OF BREAST IMPLANT  11/2016    and mastopexy    REMOVAL OF BREAST IMPLANT      TRIGGER FINGER RELEASE Left 06/2020    x2        Medications:   Medication List with Changes/Refills   Current Medications    B COMPLEX VITAMINS TABLET    Take 1 tablet by mouth once daily.    CLORAZEPATE (TRANXENE) 7.5 MG TAB    Take 1 tablet (7.5 mg total) by mouth 2 (two) times daily.    LEVOTHYROXINE (SYNTHROID) 88 MCG TABLET    TAKE 1 TABLET (88 MCG TOTAL) BY MOUTH BEFORE BREAKFAST.    MAGNESIUM 30 MG TAB    Take by mouth once.    OXYBUTYNIN (DITROPAN-XL) 10 MG 24 HR TABLET    Take 1 tablet (10 mg total) by mouth once daily.    PANTOPRAZOLE (PROTONIX) 40 MG TABLET    TAKE 1 TABLET (40 MG TOTAL) BY MOUTH ONCE DAILY.        Past Social History:   Social History     Socioeconomic History    Marital status:    Tobacco Use    Smoking status: Never     Passive exposure: Never    Smokeless tobacco: Never   Substance and Sexual Activity    Alcohol use: Never    Drug use: Never    Sexual activity:  Not Currently     Partners: Male     Birth control/protection: See Surgical Hx     Comment: Hyst       Allergies: Review of patient's allergies indicates:  No Known Allergies     Family History:   Family History   Problem Relation Age of Onset    Heart failure Mother     Liver disease Mother     COPD Mother     Cancer Father     Heart failure Father     Lung cancer Father     Skin cancer Father         Review of Systems:  Review of Systems   Constitutional:  Negative for activity change and appetite change.   HENT:  Negative for congestion and dental problem.    Eyes:  Negative for pain and discharge.   Respiratory:  Negative for chest tightness and shortness of breath.    Cardiovascular:  Negative for chest pain.   Gastrointestinal:  Negative for abdominal distention and abdominal pain.   Endocrine: Negative for cold intolerance, heat intolerance and polyuria.   Genitourinary:  Negative for decreased urine volume, difficulty urinating, dyspareunia, dysuria, enuresis, flank pain, frequency, genital sores, hematuria, menstrual problem, pelvic pain, urgency, vaginal bleeding, vaginal discharge and vaginal pain.   Musculoskeletal:  Negative for back pain and neck pain.   Skin:  Negative for color change and rash.   Allergic/Immunologic: Negative for immunocompromised state.   Neurological:  Negative for dizziness.   Hematological:  Negative for adenopathy.   Psychiatric/Behavioral:  Negative for agitation, behavioral problems and confusion.        Physical Exam:  Physical Exam  Constitutional:       Appearance: She is well-developed.   HENT:      Head: Normocephalic and atraumatic.      Right Ear: External ear normal.      Left Ear: External ear normal.   Eyes:      Conjunctiva/sclera: Conjunctivae normal.   Neck:      Vascular: No JVD.   Cardiovascular:      Rate and Rhythm: Normal rate and regular rhythm.   Pulmonary:      Effort: Pulmonary effort is normal. No respiratory distress.      Breath sounds: Normal breath  sounds. No wheezing.   Abdominal:      General: There is no distension.      Palpations: Abdomen is soft.      Tenderness: There is no abdominal tenderness. There is no rebound.   Musculoskeletal:         General: Normal range of motion.      Cervical back: Normal range of motion.   Skin:     General: Skin is warm and dry.      Findings: No erythema or rash.   Neurological:      Mental Status: She is alert and oriented to person, place, and time.   Psychiatric:         Behavior: Behavior normal.         Assessment/Plan:     Urinary urgency and nocturia:  Patient failed Ditropan.  Myrbetriq 50 mg samples provided.  If patient sees improvement in her symptoms she can notify us and we will send full prescription to her pharmacy.  Instructed patient to decrease fluid intake 4 hours before bed to help improve nocturia.    Urinary tract infection:  Urinalysis negative for infection today.  We will culture her urine sample to ensure her infection has cleared up per her request    Nephrolithiasis:  Patient is asymptomatic this time will hold off on any imaging  Problem List Items Addressed This Visit    None  Visit Diagnoses       Abdominal pain, unspecified abdominal location    -  Primary    Relevant Orders    POCT Urinalysis(Instrument) (Completed)

## 2023-11-14 ENCOUNTER — PATIENT MESSAGE (OUTPATIENT)
Dept: PRIMARY CARE CLINIC | Facility: CLINIC | Age: 68
End: 2023-11-14
Payer: MEDICARE

## 2023-11-14 ENCOUNTER — TELEPHONE (OUTPATIENT)
Dept: PRIMARY CARE CLINIC | Facility: CLINIC | Age: 68
End: 2023-11-14
Payer: MEDICARE

## 2023-11-14 NOTE — TELEPHONE ENCOUNTER
LINNEA to call back and asked to reply through [facility].     ----- Message from Rachel Flores sent at 11/14/2023 10:16 AM CST -----  Contact: self  Patient Shira Franks is requesting a call back regarding having something called out to help stabilize her mood. Please call back at [phone]

## 2023-11-15 ENCOUNTER — TELEPHONE (OUTPATIENT)
Dept: PRIMARY CARE CLINIC | Facility: CLINIC | Age: 68
End: 2023-11-15
Payer: MEDICARE

## 2023-11-15 ENCOUNTER — PATIENT MESSAGE (OUTPATIENT)
Dept: PRIMARY CARE CLINIC | Facility: CLINIC | Age: 68
End: 2023-11-15
Payer: MEDICARE

## 2023-11-15 DIAGNOSIS — F41.9 ANXIETY: Primary | ICD-10-CM

## 2023-11-15 RX ORDER — DESVENLAFAXINE SUCCINATE 50 MG/1
50 TABLET, EXTENDED RELEASE ORAL DAILY
Qty: 30 TABLET | Refills: 11 | Status: SHIPPED | OUTPATIENT
Start: 2023-11-15 | End: 2023-11-21

## 2023-11-15 NOTE — TELEPHONE ENCOUNTER
"Please review message from omaira on yesterday. She states she does take the tranxene 1/2 tab so many days in a row but still end up having a "bad attitude."     ----- Message from Laura Motta sent at 11/15/2023 11:07 AM CST -----  Contact: Shira  Patient is calling to speak with a nurse regarding prescription. Patient reports sending a request for a new medication and request to notified of status. Please give patient a call back at [phone] or send a message to [facility].   Thank you,  GH    "

## 2023-11-17 ENCOUNTER — PATIENT MESSAGE (OUTPATIENT)
Dept: UROLOGY | Facility: CLINIC | Age: 68
End: 2023-11-17
Payer: MEDICARE

## 2023-11-17 DIAGNOSIS — F41.9 ANXIETY: Primary | ICD-10-CM

## 2023-11-17 DIAGNOSIS — R11.0 NAUSEA: ICD-10-CM

## 2023-11-17 RX ORDER — ONDANSETRON 4 MG/1
4 TABLET, FILM COATED ORAL EVERY 8 HOURS PRN
Qty: 30 TABLET | Refills: 0 | Status: SHIPPED | OUTPATIENT
Start: 2023-11-17

## 2023-11-21 ENCOUNTER — TELEPHONE (OUTPATIENT)
Dept: PRIMARY CARE CLINIC | Facility: CLINIC | Age: 68
End: 2023-11-21
Payer: MEDICARE

## 2023-11-21 ENCOUNTER — PATIENT MESSAGE (OUTPATIENT)
Dept: PRIMARY CARE CLINIC | Facility: CLINIC | Age: 68
End: 2023-11-21
Payer: MEDICARE

## 2023-11-21 RX ORDER — DESVENLAFAXINE SUCCINATE 25 MG/1
1 TABLET, EXTENDED RELEASE ORAL DAILY
Qty: 90 TABLET | Refills: 0 | Status: SHIPPED | OUTPATIENT
Start: 2023-11-21 | End: 2024-02-06 | Stop reason: ALTCHOICE

## 2023-11-21 NOTE — TELEPHONE ENCOUNTER
----- Message from Kristy Wayne sent at 11/21/2023  9:07 AM CST -----  Contact: self  Type: Staff Message  Caller: Shira Lirianoabelardo Franks  Call Back Number: [phone]  Nature of the Call: desvenlafaxine succinate (PRISTIQ) 50 MG Tb24 30 tablet 11 11/15/2023 11/14/2024  Sig: Take 1 tablet (50 mg total) by mouth once daily.  Route: Oral  Additional Information: patient states with this medication she feels sedated and would like the the mg to be reduce to 25 if possible. Please advise

## 2023-12-11 ENCOUNTER — PATIENT MESSAGE (OUTPATIENT)
Dept: ADMINISTRATIVE | Facility: HOSPITAL | Age: 68
End: 2023-12-11
Payer: MEDICARE

## 2023-12-14 ENCOUNTER — PATIENT OUTREACH (OUTPATIENT)
Dept: ADMINISTRATIVE | Facility: HOSPITAL | Age: 68
End: 2023-12-14
Payer: MEDICARE

## 2023-12-14 DIAGNOSIS — Z12.31 BREAST CANCER SCREENING BY MAMMOGRAM: Primary | ICD-10-CM

## 2023-12-14 NOTE — PROGRESS NOTES
Replying to Campaign Questionnaire for Overdue HM:      Patient advised to call Central Scheduling to have mammo scheduled.

## 2024-01-03 ENCOUNTER — PATIENT MESSAGE (OUTPATIENT)
Dept: PRIMARY CARE CLINIC | Facility: CLINIC | Age: 69
End: 2024-01-03
Payer: MEDICARE

## 2024-02-06 ENCOUNTER — OFFICE VISIT (OUTPATIENT)
Dept: PRIMARY CARE CLINIC | Facility: CLINIC | Age: 69
End: 2024-02-06
Payer: MEDICARE

## 2024-02-06 VITALS
HEART RATE: 74 BPM | SYSTOLIC BLOOD PRESSURE: 122 MMHG | HEIGHT: 63 IN | DIASTOLIC BLOOD PRESSURE: 72 MMHG | WEIGHT: 134.5 LBS | BODY MASS INDEX: 23.83 KG/M2 | OXYGEN SATURATION: 98 %

## 2024-02-06 DIAGNOSIS — F41.9 ANXIETY: Primary | ICD-10-CM

## 2024-02-06 PROCEDURE — 99213 OFFICE O/P EST LOW 20 MIN: CPT | Mod: S$GLB,,, | Performed by: INTERNAL MEDICINE

## 2024-02-06 RX ORDER — ESCITALOPRAM OXALATE 5 MG/1
5 TABLET ORAL DAILY
Qty: 30 TABLET | Refills: 3 | Status: SHIPPED | OUTPATIENT
Start: 2024-02-06 | End: 2024-02-23

## 2024-02-06 RX ORDER — ASPIRIN 81 MG/1
81 TABLET ORAL DAILY
COMMUNITY

## 2024-02-06 NOTE — PROGRESS NOTES
"Subjective:      Patient ID: Shira Franks is a 68 y.o. female.    Chief Complaint: Medication Problem (She would like to change her pristiq and needs something else to help her "cope" with her anxiety. She says it has been working but she lost her  a month ago and her dog on yesterday. ) and Anxiety (She her passed one month ago and her dog on yesterday. )    HPI    Past Medical History:   Diagnosis Date    Anemia     History of herpes simplex infection     Hypothyroidism, unspecified     Kidney stone     Lymphocytic colitis     Osteopenia     Vasovagal syncope     Vitamin D deficiency        Patient is here because of grief/anxiety. She lost her  a month ago and her dog recently. She is quite upset. She is getting counseling through the [facility] but states it is only once a month.   She states she does not want clorazepate and does not want any benzodiazepine   She states she has been talking to her friends and would like to be on lexapro         ROS    Anxiety/depression/grief  Objective:     Physical Exam  Vitals reviewed.   Constitutional:       Appearance: Normal appearance.   HENT:      Head: Normocephalic and atraumatic.      Mouth/Throat:      Mouth: Mucous membranes are moist.      Pharynx: Oropharynx is clear.   Eyes:      Extraocular Movements: Extraocular movements intact.      Conjunctiva/sclera: Conjunctivae normal.      Pupils: Pupils are equal, round, and reactive to light.   Cardiovascular:      Rate and Rhythm: Regular rhythm.   Musculoskeletal:      Right lower leg: No edema.      Left lower leg: No edema.   Skin:     General: Skin is warm.      Capillary Refill: Capillary refill takes less than 2 seconds.   Neurological:      Mental Status: She is alert and oriented to person, place, and time.   Psychiatric:         Mood and Affect: Mood normal.      Comments: Upset/anxious       /72 (BP Location: Right arm, Patient Position: Sitting, BP Method: Medium (Automatic))   Pulse " "74   Ht 5' 3" (1.6 m)   Wt 61 kg (134 lb 8 oz)   SpO2 98%   BMI 23.83 kg/m²     Assessment:       ICD-10-CM ICD-9-CM   1. Anxiety  F41.9 300.00       Plan:     Medication List with Changes/Refills   New Medications    ESCITALOPRAM OXALATE (LEXAPRO) 5 MG TAB    Take 1 tablet (5 mg total) by mouth once daily.   Current Medications    ASPIRIN (ECOTRIN) 81 MG EC TABLET    Take 81 mg by mouth once daily.    B COMPLEX VITAMINS TABLET    Take 1 tablet by mouth once daily.    CLORAZEPATE (TRANXENE) 7.5 MG TAB    Take 1 tablet (7.5 mg total) by mouth 2 (two) times daily.    LEVOTHYROXINE (SYNTHROID) 88 MCG TABLET    TAKE 1 TABLET (88 MCG TOTAL) BY MOUTH BEFORE BREAKFAST.    MAGNESIUM 30 MG TAB    Take by mouth once.    ONDANSETRON (ZOFRAN) 4 MG TABLET    Take 1 tablet (4 mg total) by mouth every 8 (eight) hours as needed for Nausea.    PANTOPRAZOLE (PROTONIX) 40 MG TABLET    TAKE 1 TABLET (40 MG TOTAL) BY MOUTH ONCE DAILY.   Discontinued Medications    DESVENLAFAXINE SUCCINATE (PRISTIQ) 25 MG TB24    Take 1 tablet (25 mg total) by mouth once daily.    OXYBUTYNIN (DITROPAN-XL) 10 MG 24 HR TABLET    Take 1 tablet (10 mg total) by mouth once daily.        1. Anxiety  -     EScitalopram oxalate (LEXAPRO) 5 MG Tab; Take 1 tablet (5 mg total) by mouth once daily.  Dispense: 30 tablet; Refill: 3       Discussed increasing her counseling sessions. She is still in the grieving process    I discussed buspar as well but she doesn't want to take a lot of medications    She will wean off pristiq first (states the 50 mg dose did not agree with her) and then start lexapro        Future Appointments   Date Time Provider Department Center   3/27/2024  9:20 AM Shelia Kntot MD [facility] PRICG5 [facility]   9/23/2024  7:00 AM [facility] GASTRO [facility] GASTRO  [address]   10/31/2024 12:15 PM Isabel Matute MD [facility] GASTRO  [address]   11/11/2024  9:20 AM Stella Moody NP [facility] UROLOGY  [address]                "

## 2024-02-07 ENCOUNTER — PATIENT MESSAGE (OUTPATIENT)
Dept: PRIMARY CARE CLINIC | Facility: CLINIC | Age: 69
End: 2024-02-07
Payer: MEDICARE

## 2024-02-23 DIAGNOSIS — F41.9 ANXIETY: ICD-10-CM

## 2024-02-23 RX ORDER — DESVENLAFAXINE SUCCINATE 25 MG/1
1 TABLET, EXTENDED RELEASE ORAL DAILY
Qty: 90 TABLET | Refills: 0 | Status: SHIPPED | OUTPATIENT
Start: 2024-02-23

## 2024-03-04 RX ORDER — PANTOPRAZOLE SODIUM 40 MG/1
40 TABLET, DELAYED RELEASE ORAL DAILY
Qty: 90 TABLET | Refills: 3 | Status: SHIPPED | OUTPATIENT
Start: 2024-03-04

## 2024-04-08 ENCOUNTER — PATIENT MESSAGE (OUTPATIENT)
Dept: PRIMARY CARE CLINIC | Facility: CLINIC | Age: 69
End: 2024-04-08
Payer: MEDICARE

## 2024-05-30 ENCOUNTER — DOCUMENTATION ONLY (OUTPATIENT)
Dept: OBSTETRICS AND GYNECOLOGY | Facility: CLINIC | Age: 69
End: 2024-05-30
Payer: MEDICARE

## 2024-09-16 ENCOUNTER — TELEPHONE (OUTPATIENT)
Dept: GASTROENTEROLOGY | Facility: CLINIC | Age: 69
End: 2024-09-16
Payer: MEDICARE

## 2024-09-16 VITALS — BODY MASS INDEX: 23.74 KG/M2 | WEIGHT: 134 LBS | HEIGHT: 63 IN

## 2024-09-16 DIAGNOSIS — K52.832 LYMPHOCYTIC COLITIS: Primary | ICD-10-CM

## 2024-09-16 DIAGNOSIS — E03.9 HYPOTHYROIDISM, UNSPECIFIED TYPE: ICD-10-CM

## 2024-09-16 DIAGNOSIS — Z12.11 SCREENING FOR COLON CANCER: ICD-10-CM

## 2024-09-16 RX ORDER — SOD SULF/POT CHLORIDE/MAG SULF 1.479 G
TABLET ORAL
Qty: 24 TABLET | Refills: 0 | Status: SHIPPED | OUTPATIENT
Start: 2024-09-16 | End: 2024-09-19

## 2024-09-16 RX ORDER — LEVOTHYROXINE SODIUM 88 UG/1
88 TABLET ORAL
Qty: 90 TABLET | Refills: 1 | Status: SHIPPED | OUTPATIENT
Start: 2024-09-16

## 2024-09-16 NOTE — TELEPHONE ENCOUNTER
"[facility]  [address] [place] [address]  Phone: [phone]  Fax: [phone]    History & Physical         Provider: Dr. Isabel Saavedra    Patient Name: Shira CLEVELAND (age):1955  69 y.o.           Gender: female   Phone: [phone]     Referring Physician: Shelia Knott     Vital Signs:   Height - 5' 3"  Weight - 134 lb  BMI -  23.74    Plan: Colonoscopy @ [facility]    Encounter Diagnoses   Name Primary?    Lymphocytic colitis Yes    Screening for colon cancer            History:      Past Medical History:   Diagnosis Date    Anemia     History of herpes simplex infection     Hypothyroidism, unspecified     Kidney stone     Lymphocytic colitis     Osteopenia     Vasovagal syncope     Vitamin D deficiency       Past Surgical History:   Procedure Laterality Date    AUGMENTATION OF BREAST      BUNIONECTOMY Bilateral     COLONOSCOPY      14,  22    LIPOSUCTION OF THIGH      REMOVAL OF BREAST IMPLANT  2016    and mastopexy    REMOVAL OF BREAST IMPLANT      TRIGGER FINGER RELEASE Left 06/2020    x2      Medication List with Changes/Refills   Current Medications    ASPIRIN (ECOTRIN) 81 MG EC TABLET    Take 81 mg by mouth once daily.    B COMPLEX VITAMINS TABLET    Take 1 tablet by mouth once daily.    CLORAZEPATE (TRANXENE) 7.5 MG TAB    Take 1 tablet (7.5 mg total) by mouth 2 (two) times daily.    DESVENLAFAXINE SUCCINATE (PRISTIQ) 25 MG TB24    TAKE 1 TABLET (25 MG TOTAL) BY MOUTH ONCE DAILY.    LEVOTHYROXINE (SYNTHROID) 88 MCG TABLET    TAKE 1 TABLET (88 MCG TOTAL) BY MOUTH BEFORE BREAKFAST.    MAGNESIUM 30 MG TAB    Take by mouth once.    ONDANSETRON (ZOFRAN) 4 MG TABLET    Take 1 tablet (4 mg total) by mouth every 8 (eight) hours as needed for Nausea.    PANTOPRAZOLE (PROTONIX) 40 MG TABLET    Take 1 tablet (40 mg total) by mouth once daily.      Review of patient's allergies " indicates:  No Known Allergies   Family History   Problem Relation Name Age of Onset    Heart failure Mother      Liver disease Mother      COPD Mother      Cancer Father      Heart failure Father      Lung cancer Father      Skin cancer Father        Social History     Tobacco Use    Smoking status: Never     Passive exposure: Never    Smokeless tobacco: Never   Substance Use Topics    Alcohol use: Never    Drug use: Never        Physical Examination:     General Appearance:___________________________  HEENT: _____________________________________  Abdomen:____________________________________  Heart:________________________________________  Lungs:_______________________________________  Extremities:___________________________________  Skin:_________________________________________  Endocrine:____________________________________  Genitourinary:_________________________________  Neurological:__________________________________      Patient has been evaluated immediately prior to sedation and is medically cleared for endoscopy with IVCS as an ASA class: ______      Physician Signature: _________________________       Date: ________  Time: ________

## 2024-09-16 NOTE — TELEPHONE ENCOUNTER
S/w pt and told her that I was calling as a courtesy regarding up coming Colon with NBP on 9/23/24, Monday and wanted to verify that she has her paper prep instructions and meds. Pt stated she has the instructions, but needs to pickup meds. I sent message to NP's to resend it. I also mentioned that [facility] will call the day before (FRI) with the arrival time, GI Lab is located on the third floor, and to pre-register anytime this week. azael

## 2024-09-19 ENCOUNTER — TELEPHONE (OUTPATIENT)
Dept: GASTROENTEROLOGY | Facility: CLINIC | Age: 69
End: 2024-09-19
Payer: MEDICARE

## 2024-09-19 ENCOUNTER — PATIENT MESSAGE (OUTPATIENT)
Dept: GASTROENTEROLOGY | Facility: CLINIC | Age: 69
End: 2024-09-19
Payer: MEDICARE

## 2024-09-19 RX ORDER — SODIUM, POTASSIUM,MAG SULFATES 17.5-3.13G
SOLUTION, RECONSTITUTED, ORAL ORAL
Qty: 1 KIT | Refills: 0 | Status: SHIPPED | OUTPATIENT
Start: 2024-09-19

## 2024-09-19 NOTE — TELEPHONE ENCOUNTER
Contacted patient and let her know to take 2 cap fulls daily week prior to colonoscopy to loosen stools. Patient also notified by pharmacy that they are ordering liquid prep and it will be delivered to pharmacy tomorrow for patient to . BF

## 2024-09-19 NOTE — TELEPHONE ENCOUNTER
----- Message from Shikha Edwards sent at 9/19/2024 11:59 AM CDT -----  Regarding: prep questiona appt Monday  Contact: pt  Pt calling wanting to know how many ties to take the marilax and she can be reached at [phone]     Thanks,

## 2024-09-19 NOTE — TELEPHONE ENCOUNTER
Patient states last time she took the SUTAB, these pills did not work for her. Patient states she needs to switch to the liquid. Please send liquid to [facility]. Scheduled on 9/23

## 2024-09-19 NOTE — TELEPHONE ENCOUNTER
She should take 1 capful twice daily. Goal is loose stools daily week before procedure. I see previous message. Will send liquid prep.  Kn

## 2024-09-19 NOTE — TELEPHONE ENCOUNTER
----- Message from Trudi Bragg sent at 9/18/2024  1:51 PM CDT -----  Contact: self  Patient is requesting a call back regarding question about prep medication . Please call back at [phone]

## 2024-09-20 NOTE — TELEPHONE ENCOUNTER
Rec'd incoming from [facility] pre-admitting needed order to be re-faxed over, pt is at [facility] wanting to pre-register. azael

## 2024-09-23 ENCOUNTER — OUTSIDE PLACE OF SERVICE (OUTPATIENT)
Dept: GASTROENTEROLOGY | Facility: CLINIC | Age: 69
End: 2024-09-23

## 2024-09-23 LAB — CRC RECOMMENDATION EXT: NORMAL

## 2024-09-23 PROCEDURE — 45380 COLONOSCOPY AND BIOPSY: CPT | Mod: ,,, | Performed by: INTERNAL MEDICINE

## 2024-10-01 ENCOUNTER — TELEPHONE (OUTPATIENT)
Dept: GASTROENTEROLOGY | Facility: CLINIC | Age: 69
End: 2024-10-01
Payer: MEDICARE

## 2024-10-02 NOTE — TELEPHONE ENCOUNTER
CBx nl, repeat colonoscopy in 10 years.     Notify patient no colitis on her colon Bx. Repeat colonoscopy evaluation in 10 years.  Notify me sooner if any issues. Confirm recall tab in appointment desk is up-to-date (update if needed).  NBP

## 2024-11-18 ENCOUNTER — DOCUMENTATION ONLY (OUTPATIENT)
Dept: GASTROENTEROLOGY | Facility: CLINIC | Age: 69
End: 2024-11-18
Payer: MEDICARE

## 2025-03-17 RX ORDER — PANTOPRAZOLE SODIUM 40 MG/1
40 TABLET, DELAYED RELEASE ORAL DAILY
Qty: 90 TABLET | Refills: 2 | Status: SHIPPED | OUTPATIENT
Start: 2025-03-17

## 2025-06-17 ENCOUNTER — TELEPHONE (OUTPATIENT)
Dept: GASTROENTEROLOGY | Facility: CLINIC | Age: 70
End: 2025-06-17

## 2025-06-17 NOTE — TELEPHONE ENCOUNTER
Copied from CRM #[id number]. Topic: General Inquiry - Return Call  >> Jun 17, 2025 10:30 AM Kaitlynn wrote:  Type:  Patient Requesting Call    Who Called:Shira Franks  Does the patient know what this is regarding?:pt is calling to speak with nurse regarding anamika scheduled 6/17, pt wants to know if she can change appt to virtual  Would the patient rather a call back or a response via [facility]? call  Best Call Back Number: [phone]    Additional Information:    Called patient got rescheduled for later date with nbp due to patient taking care of parents. -abo